# Patient Record
Sex: FEMALE | Race: BLACK OR AFRICAN AMERICAN | NOT HISPANIC OR LATINO | Employment: FULL TIME | URBAN - METROPOLITAN AREA
[De-identification: names, ages, dates, MRNs, and addresses within clinical notes are randomized per-mention and may not be internally consistent; named-entity substitution may affect disease eponyms.]

---

## 2019-07-17 ENCOUNTER — OFFICE VISIT (OUTPATIENT)
Dept: FAMILY MEDICINE | Facility: CLINIC | Age: 39
End: 2019-07-17
Payer: COMMERCIAL

## 2019-07-17 VITALS
BODY MASS INDEX: 21.97 KG/M2 | SYSTOLIC BLOOD PRESSURE: 117 MMHG | TEMPERATURE: 97.5 F | HEIGHT: 63 IN | OXYGEN SATURATION: 100 % | DIASTOLIC BLOOD PRESSURE: 73 MMHG | HEART RATE: 65 BPM | WEIGHT: 124 LBS

## 2019-07-17 DIAGNOSIS — Z00.00 ENCOUNTER FOR PREVENTATIVE ADULT HEALTH CARE EXAMINATION: Primary | ICD-10-CM

## 2019-07-17 DIAGNOSIS — N94.6 DYSMENORRHEA: ICD-10-CM

## 2019-07-17 DIAGNOSIS — Z13.1 SCREENING FOR DIABETES MELLITUS: ICD-10-CM

## 2019-07-17 DIAGNOSIS — Z23 NEED FOR TDAP VACCINATION: ICD-10-CM

## 2019-07-17 DIAGNOSIS — R63.4 WEIGHT LOSS: ICD-10-CM

## 2019-07-17 DIAGNOSIS — Z13.6 CARDIOVASCULAR SCREENING; LDL GOAL LESS THAN 100: ICD-10-CM

## 2019-07-17 DIAGNOSIS — H54.7 VISION PROBLEM: ICD-10-CM

## 2019-07-17 LAB
ALBUMIN SERPL-MCNC: 3.9 G/DL (ref 3.4–5)
ALP SERPL-CCNC: 51 U/L (ref 40–150)
ALT SERPL W P-5'-P-CCNC: 25 U/L (ref 0–50)
ANION GAP SERPL CALCULATED.3IONS-SCNC: 8 MMOL/L (ref 3–14)
AST SERPL W P-5'-P-CCNC: 28 U/L (ref 0–45)
BASOPHILS # BLD AUTO: 0 10E9/L (ref 0–0.2)
BASOPHILS NFR BLD AUTO: 0.2 %
BILIRUB SERPL-MCNC: 0.1 MG/DL (ref 0.2–1.3)
BUN SERPL-MCNC: 13 MG/DL (ref 7–30)
CALCIUM SERPL-MCNC: 8.8 MG/DL (ref 8.5–10.1)
CHLORIDE SERPL-SCNC: 109 MMOL/L (ref 94–109)
CHOLEST SERPL-MCNC: 164 MG/DL
CO2 SERPL-SCNC: 24 MMOL/L (ref 20–32)
CREAT SERPL-MCNC: 0.68 MG/DL (ref 0.52–1.04)
DIFFERENTIAL METHOD BLD: NORMAL
EOSINOPHIL # BLD AUTO: 0.1 10E9/L (ref 0–0.7)
EOSINOPHIL NFR BLD AUTO: 2.2 %
ERYTHROCYTE [DISTWIDTH] IN BLOOD BY AUTOMATED COUNT: 13.9 % (ref 10–15)
GFR SERPL CREATININE-BSD FRML MDRD: >90 ML/MIN/{1.73_M2}
GLUCOSE SERPL-MCNC: 74 MG/DL (ref 70–99)
HCT VFR BLD AUTO: 35.6 % (ref 35–47)
HDLC SERPL-MCNC: 89 MG/DL
HGB BLD-MCNC: 11.9 G/DL (ref 11.7–15.7)
LDLC SERPL CALC-MCNC: 61 MG/DL
LYMPHOCYTES # BLD AUTO: 2.5 10E9/L (ref 0.8–5.3)
LYMPHOCYTES NFR BLD AUTO: 46.5 %
MCH RBC QN AUTO: 30.6 PG (ref 26.5–33)
MCHC RBC AUTO-ENTMCNC: 33.4 G/DL (ref 31.5–36.5)
MCV RBC AUTO: 92 FL (ref 78–100)
MONOCYTES # BLD AUTO: 0.4 10E9/L (ref 0–1.3)
MONOCYTES NFR BLD AUTO: 6.5 %
NEUTROPHILS # BLD AUTO: 2.4 10E9/L (ref 1.6–8.3)
NEUTROPHILS NFR BLD AUTO: 44.6 %
NONHDLC SERPL-MCNC: 75 MG/DL
PLATELET # BLD AUTO: 243 10E9/L (ref 150–450)
POTASSIUM SERPL-SCNC: 3.6 MMOL/L (ref 3.4–5.3)
PROT SERPL-MCNC: 7.4 G/DL (ref 6.8–8.8)
RBC # BLD AUTO: 3.89 10E12/L (ref 3.8–5.2)
SODIUM SERPL-SCNC: 141 MMOL/L (ref 133–144)
TRIGL SERPL-MCNC: 72 MG/DL
TSH SERPL DL<=0.005 MIU/L-ACNC: 1.54 MU/L (ref 0.4–4)
WBC # BLD AUTO: 5.4 10E9/L (ref 4–11)

## 2019-07-17 PROCEDURE — 84443 ASSAY THYROID STIM HORMONE: CPT | Performed by: FAMILY MEDICINE

## 2019-07-17 PROCEDURE — 36415 COLL VENOUS BLD VENIPUNCTURE: CPT | Performed by: FAMILY MEDICINE

## 2019-07-17 PROCEDURE — 80053 COMPREHEN METABOLIC PANEL: CPT | Performed by: FAMILY MEDICINE

## 2019-07-17 PROCEDURE — 90471 IMMUNIZATION ADMIN: CPT | Performed by: FAMILY MEDICINE

## 2019-07-17 PROCEDURE — 90715 TDAP VACCINE 7 YRS/> IM: CPT | Performed by: FAMILY MEDICINE

## 2019-07-17 PROCEDURE — 99386 PREV VISIT NEW AGE 40-64: CPT | Mod: 25 | Performed by: FAMILY MEDICINE

## 2019-07-17 PROCEDURE — 80061 LIPID PANEL: CPT | Performed by: FAMILY MEDICINE

## 2019-07-17 PROCEDURE — 85025 COMPLETE CBC W/AUTO DIFF WBC: CPT | Performed by: FAMILY MEDICINE

## 2019-07-17 ASSESSMENT — ENCOUNTER SYMPTOMS
PALPITATIONS: 0
EYE PAIN: 1
JOINT SWELLING: 0
ARTHRALGIAS: 0
PARESTHESIAS: 0
HEMATOCHEZIA: 0
CHILLS: 0
DIARRHEA: 0
NERVOUS/ANXIOUS: 0
HEADACHES: 1
WEAKNESS: 0
HEARTBURN: 0
BREAST MASS: 0
HEMATURIA: 0
FEVER: 0
CONSTIPATION: 0
NAUSEA: 0
MYALGIAS: 0
FREQUENCY: 0
SHORTNESS OF BREATH: 0
DIZZINESS: 0
DYSURIA: 0
SORE THROAT: 0

## 2019-07-17 ASSESSMENT — PAIN SCALES - GENERAL: PAINLEVEL: NO PAIN (0)

## 2019-07-17 ASSESSMENT — MIFFLIN-ST. JEOR: SCORE: 1202.71

## 2019-07-17 NOTE — LETTER
[facility]   [address] [place]  [address]  [phone]                                   July 18, 2019    Erin Cardenas  [address] [facility] [address]        Dear Erin,    These lab results are all fine.    Results for orders placed or performed in visit on 07/17/19   Lipid panel reflex to direct LDL Non-fasting   Result Value Ref Range    Cholesterol 164 <200 mg/dL    Triglycerides 72 <150 mg/dL    HDL Cholesterol 89 >49 mg/dL    LDL Cholesterol Calculated 61 <100 mg/dL    Non HDL Cholesterol 75 <130 mg/dL   TSH with free T4 reflex   Result Value Ref Range    TSH 1.54 0.40 - 4.00 mU/L   Comprehensive metabolic panel   Result Value Ref Range    Sodium 141 133 - 144 mmol/L    Potassium 3.6 3.4 - 5.3 mmol/L    Chloride 109 94 - 109 mmol/L    Carbon Dioxide 24 20 - 32 mmol/L    Anion Gap 8 3 - 14 mmol/L    Glucose 74 70 - 99 mg/dL    Urea Nitrogen 13 7 - 30 mg/dL    Creatinine 0.68 0.52 - 1.04 mg/dL    GFR Estimate >90 >60 mL/min/[1.73_m2]    GFR Estimate If Black >90 >60 mL/min/[1.73_m2]    Calcium 8.8 8.5 - 10.1 mg/dL    Bilirubin Total 0.1 (L) 0.2 - 1.3 mg/dL    Albumin 3.9 3.4 - 5.0 g/dL    Protein Total 7.4 6.8 - 8.8 g/dL    Alkaline Phosphatase 51 40 - 150 U/L    ALT 25 0 - 50 U/L    AST 28 0 - 45 U/L   CBC with platelets differential   Result Value Ref Range    WBC 5.4 4.0 - 11.0 10e9/L    RBC Count 3.89 3.8 - 5.2 10e12/L    Hemoglobin 11.9 11.7 - 15.7 g/dL    Hematocrit 35.6 35.0 - 47.0 %    MCV 92 78 - 100 fl    MCH 30.6 26.5 - 33.0 pg    MCHC 33.4 31.5 - 36.5 g/dL    RDW 13.9 10.0 - 15.0 %    Platelet Count 243 150 - 450 10e9/L    % Neutrophils 44.6 %    % Lymphocytes 46.5 %    % Monocytes 6.5 %    % Eosinophils 2.2 %    % Basophils 0.2 %    Absolute Neutrophil 2.4 1.6 - 8.3 10e9/L    Absolute Lymphocytes 2.5 0.8 - 5.3 10e9/L    Absolute Monocytes 0.4 0.0 - 1.3 10e9/L    Absolute Eosinophils 0.1 0.0 - 0.7 10e9/L    Absolute Basophils 0.0 0.0 - 0.2 10e9/L     Diff Method Automated Method        If you have any questions please call the clinic at [phone]    Sincerely,    Derrell Mckeon MD  hnr

## 2019-07-17 NOTE — PATIENT INSTRUCTIONS
Call to schedule eye exam     Call to schedule with gynecology regarding possible tubal ligation ( permanent birth control ) and also regarding possible pap smear to screen for cervical cancer and also the painful periods    For the painful periods, try to start the ibuprofen 1-2 days before the period starts    Use condoms for intercourse until more permanent birth control done    We will send you lab results

## 2019-07-17 NOTE — PROGRESS NOTES
SUBJECTIVE:   CC: Erin Cardenas is an 39 year old woman who presents for preventive health visit.     Healthy Habits:     Getting at least 3 servings of Calcium per day:  Yes    Bi-annual eye exam:  Yes    Dental care twice a year:  NO    Sleep apnea or symptoms of sleep apnea:  None    Diet:  Regular (no restrictions)    Frequency of exercise:  2-3 days/week    Duration of exercise:  15-30 minutes    Taking medications regularly:  Yes    Medication side effects:  None    PHQ-2 Total Score: 0    Additional concerns today:  Yes          none    Today's PHQ-2 Score:   PHQ-2 ( 1999 Pfizer) 7/17/2019   Q1: Little interest or pleasure in doing things 0   Q2: Feeling down, depressed or hopeless 0   PHQ-2 Score 0   Q1: Little interest or pleasure in doing things Not at all   Q2: Feeling down, depressed or hopeless Not at all   PHQ-2 Score 0       Abuse: Current or Past(Physical, Sexual or Emotional)- No  Do you feel safe in your environment? Yes    Social History     Tobacco Use     Smoking status: Never Smoker     Smokeless tobacco: Never Used   Substance Use Topics     Alcohol use: Yes     If you drink alcohol do you typically have >3 drinks per day or >7 drinks per week? No    Alcohol Use 7/17/2019   Prescreen: >3 drinks/day or >7 drinks/week? No   Prescreen: >3 drinks/day or >7 drinks/week? -   No flowsheet data found.    Reviewed orders with patient.  Reviewed health maintenance and updated orders accordingly - Yes       Mammogram Screening: Patient under age 50, mutual decision reflected in health maintenance.      Pertinent mammograms are reviewed under the imaging tab.  History of abnormal Pap smear: never had a pap         Reviewed and updated as needed this visit by clinical staff  Tobacco  Allergies  Meds  Med Hx  Surg Hx  Fam Hx  Soc Hx        Reviewed and updated as needed this visit by Provider            Review of Systems   Constitutional: Negative for chills and fever.   HENT: Negative for congestion,  "ear pain, hearing loss and sore throat.    Eyes: Positive for pain and visual disturbance.   Respiratory: Negative for shortness of breath.    Cardiovascular: Negative for chest pain, palpitations and peripheral edema.   Gastrointestinal: Negative for constipation, diarrhea, heartburn, hematochezia and nausea.   Breasts:  Negative for tenderness, breast mass and discharge.   Genitourinary: Positive for pelvic pain. Negative for dysuria, frequency, genital sores, hematuria, urgency, vaginal bleeding and vaginal discharge.   Musculoskeletal: Negative for arthralgias, joint swelling and myalgias.   Skin: Negative for rash.   Neurological: Positive for headaches. Negative for dizziness, weakness and paresthesias.   Psychiatric/Behavioral: Negative for mood changes. The patient is not nervous/anxious.      No glasses or contacts    No eye exam in last few years    Eye pain an blurred vision for a couple years     Pain with period  Bad pain    4 over the counter ibuprofen     OBJECTIVE:   /73 (BP Location: Left arm, Patient Position: Chair, Cuff Size: Adult Regular)   Pulse 65   Temp 97.5  F (36.4  C) (Oral)   Ht 1.61 m (5' 3.39\")   Wt 56.2 kg (124 lb)   LMP 06/27/2019   SpO2 100%   Breastfeeding? No   BMI 21.70 kg/m    Physical Exam  GENERAL: healthy, alert and no distress  EYES: Eyes grossly normal to inspection, PERRL and conjunctivae and sclerae normal  HENT: ear canals and TM's normal, nose and mouth without ulcers or lesions  NECK: no adenopathy, no asymmetry, masses, or scars and thyroid normal to palpation  RESP: lungs clear to auscultation - no rales, rhonchi or wheezes  CV: regular rate and rhythm, normal S1 S2, no S3 or S4, no murmur, click or rub, no peripheral edema and peripheral pulses strong  ABDOMEN: soft, nontender, no hepatosplenomegaly, no masses and bowel sounds normal  MS: no gross musculoskeletal defects noted, no edema  SKIN: no suspicious lesions or rashes  NEURO: Normal strength and " tone, mentation intact and speech normal  PSYCH: mentation appears normal, affect normal/bright    Diagnostic Test Results:  Labs reviewed in Epic    Lost 24 lb in about 8 months    Now stable    ASSESSMENT/PLAN:   Erin was seen today for physical and health maintenance.    Diagnoses and all orders for this visit:    Encounter for preventative adult health care examination    Need for Tdap vaccination  -     TDAP, IM (10 - 64 YRS) - Adacel  -     VACCINE ADMINISTRATION, INITIAL    Contraception  -     OB/GYN REFERRAL    Dysmenorrhea  -     OB/GYN REFERRAL    Screening for diabetes mellitus  -     Cancel: Glucose whole blood    CARDIOVASCULAR SCREENING; LDL GOAL LESS THAN 100  -     Lipid panel reflex to direct LDL Non-fasting    Vision problem  -     OPTOMETRY REFERRAL    Weight loss  -     TSH with free T4 reflex  -     Comprehensive metabolic panel  -     CBC with platelets differential    Other orders  -     SCREENING QUESTIONS FOR PED IMMUNIZATIONS    Discussed multiple issues with patient   She has never had pap; [place]  Advised taking ibuprofen for a day or two prior to onset of period to head off some of the menstrual pain  Patient and  ( who we also saw today) have 4 kids and don't want any more; thus advised they use condoms every time until they do something more permanent.  Did referrals for gynecology for her and urology for him.  They can discuss as couple and decide whether to pursue tubal or vasectomy.  No std concerns.  Patient lost wt but it is now stable and she is still in normal bmi range.  If wt loss occurs again, return to clinic.  We did some labwork.  tdap given  No std concern  Patient to schedule eye exam    COUNSELING:  Reviewed preventive health counseling, as reflected in patient instructions       Regular exercise       Healthy diet/nutrition       Vision screening       Contraception       Family planning    Estimated body mass index is 21.7  "kg/m  as calculated from the following:    Height as of this encounter: 1.61 m (5' 3.39\").    Weight as of this encounter: 56.2 kg (124 lb).         reports that she has never smoked. She has never used smokeless tobacco.      Counseling Resources:  ATP IV Guidelines  Pooled Cohorts Equation Calculator  Breast Cancer Risk Calculator  FRAX Risk Assessment  ICSI Preventive Guidelines  Dietary Guidelines for Americans, 2010  USDA's MyPlate  ASA Prophylaxis  Lung CA Screening    Derrell Mckeon MD  [facility]  "

## 2021-01-08 NOTE — PROGRESS NOTES
"  Assessment & Plan     Pregnancy test positive  Reviewed healthy pregnancy lifestyle: No smoking or alcohol, start daily prenatal vitamin, avoid raw/undercooked meats and soft cheeses, limit fish to 2 servings per week and avoid tuna or seabass which are high in mercury, limit caffeine to 2 cups of coffee per day, don't change litterbox.   Reviewed warning signs of miscarriage including cramping and vaginal bleeding.   Schedule first OB around 8 weeks gestation  - HCG Qual, Urine ([id number])  - Prenatal Vit-Fe Fumarate-FA (PRENATAL VITAMIN PLUS LOW IRON) 27-1 MG TABS; Take 1 tablet by mouth daily  - OB/GYN REFERRAL    Nausea and vomiting during pregnancy  Eat small portions of bland food frequently, avoiding an empty stomach.  - pyridOXINE (VITAMIN B6) 25 MG tablet; Take 1 tablet (25 mg) by mouth 3 times daily as needed (nausea)    Review of the result(s) of each unique test - urine pregnancy test                     See Patient Instructions    Return in about 2 weeks (around 2021) for OB appt.    TRINITY [facility] DEON Khan is a 40 year old  who presents to clinic today for the following health issues     HPI       Chief Complaint   Patient presents with     Amenorrhea     NAREN 9/3/21  6w5d  Has had some nausea and has lost weight- unsure how much.  Prior pregnancies were in [place], no complications.        Review of Systems   Constitutional, HEENT, cardiovascular, pulmonary, gi and gu systems are negative, except as otherwise noted.      Objective    /74 (BP Location: Right arm, Patient Position: Chair, Cuff Size: Adult Regular)   Pulse 81   Temp 98.1  F (36.7  C) (Oral)   Ht 1.613 m (5' 3.5\")   Wt 55.8 kg (123 lb)   LMP 2020 (Exact Date)   SpO2 100%   BMI 21.45 kg/m    Body mass index is 21.45 kg/m .  Physical Exam   GENERAL: healthy, alert and no distress  PSYCH: mentation appears normal, affect normal/bright    Results for " orders placed or performed in visit on 01/13/21 (from the past 24 hour(s))   HCG Qual, Urine ([id number])   Result Value Ref Range    HCG Qual Urine Positive (A) NEG^Negative

## 2021-01-13 ENCOUNTER — OFFICE VISIT (OUTPATIENT)
Dept: FAMILY MEDICINE | Facility: CLINIC | Age: 41
End: 2021-01-13
Payer: COMMERCIAL

## 2021-01-13 VITALS
BODY MASS INDEX: 21 KG/M2 | HEIGHT: 64 IN | DIASTOLIC BLOOD PRESSURE: 74 MMHG | WEIGHT: 123 LBS | SYSTOLIC BLOOD PRESSURE: 117 MMHG | TEMPERATURE: 98.1 F | HEART RATE: 81 BPM | OXYGEN SATURATION: 100 %

## 2021-01-13 DIAGNOSIS — Z32.01 PREGNANCY TEST POSITIVE: Primary | ICD-10-CM

## 2021-01-13 DIAGNOSIS — O21.9 NAUSEA AND VOMITING DURING PREGNANCY: ICD-10-CM

## 2021-01-13 LAB — HCG UR QL: POSITIVE

## 2021-01-13 PROCEDURE — 99213 OFFICE O/P EST LOW 20 MIN: CPT | Performed by: NURSE PRACTITIONER

## 2021-01-13 PROCEDURE — 81025 URINE PREGNANCY TEST: CPT | Performed by: NURSE PRACTITIONER

## 2021-01-13 RX ORDER — PYRIDOXINE HCL (VITAMIN B6) 25 MG
25 TABLET ORAL 3 TIMES DAILY PRN
Qty: 90 TABLET | Refills: 1 | Status: SHIPPED | OUTPATIENT
Start: 2021-01-13 | End: 2021-10-27

## 2021-01-13 RX ORDER — VITAMIN A, VITAMIN C, VITAMIN D-3, VITAMIN E, VITAMIN B-1, VITAMIN B-2, NIACIN, VITAMIN B-6, CALCIUM, IRON, ZINC, COPPER 4000; 120; 400; 22; 1.84; 3; 20; 10; 1; 12; 200; 27; 25; 2 [IU]/1; MG/1; [IU]/1; MG/1; MG/1; MG/1; MG/1; MG/1; MG/1; UG/1; MG/1; MG/1; MG/1; MG/1
1 TABLET ORAL DAILY
Qty: 100 TABLET | Refills: 3 | Status: SHIPPED | OUTPATIENT
Start: 2021-01-13 | End: 2022-11-21

## 2021-01-13 ASSESSMENT — MIFFLIN-ST. JEOR: SCORE: 1194.98

## 2021-01-27 ENCOUNTER — PRENATAL OFFICE VISIT (OUTPATIENT)
Dept: OBGYN | Facility: CLINIC | Age: 41
End: 2021-01-27
Payer: COMMERCIAL

## 2021-01-27 VITALS
WEIGHT: 126 LBS | HEART RATE: 71 BPM | DIASTOLIC BLOOD PRESSURE: 79 MMHG | SYSTOLIC BLOOD PRESSURE: 124 MMHG | BODY MASS INDEX: 21.51 KG/M2 | HEIGHT: 64 IN | OXYGEN SATURATION: 99 %

## 2021-01-27 DIAGNOSIS — O09.521 MULTIGRAVIDA OF ADVANCED MATERNAL AGE IN FIRST TRIMESTER: Primary | ICD-10-CM

## 2021-01-27 DIAGNOSIS — Z12.4 PAP SMEAR FOR CERVICAL CANCER SCREENING: ICD-10-CM

## 2021-01-27 LAB
BASOPHILS # BLD AUTO: 0 10E9/L (ref 0–0.2)
BASOPHILS NFR BLD AUTO: 0.1 %
DIFFERENTIAL METHOD BLD: NORMAL
EOSINOPHIL # BLD AUTO: 0.1 10E9/L (ref 0–0.7)
EOSINOPHIL NFR BLD AUTO: 1.2 %
ERYTHROCYTE [DISTWIDTH] IN BLOOD BY AUTOMATED COUNT: 13.4 % (ref 10–15)
HCT VFR BLD AUTO: 38.6 % (ref 35–47)
HGB BLD-MCNC: 13.1 G/DL (ref 11.7–15.7)
LYMPHOCYTES # BLD AUTO: 2.5 10E9/L (ref 0.8–5.3)
LYMPHOCYTES NFR BLD AUTO: 37.3 %
MCH RBC QN AUTO: 31.6 PG (ref 26.5–33)
MCHC RBC AUTO-ENTMCNC: 33.9 G/DL (ref 31.5–36.5)
MCV RBC AUTO: 93 FL (ref 78–100)
MONOCYTES # BLD AUTO: 0.5 10E9/L (ref 0–1.3)
MONOCYTES NFR BLD AUTO: 6.9 %
NEUTROPHILS # BLD AUTO: 3.6 10E9/L (ref 1.6–8.3)
NEUTROPHILS NFR BLD AUTO: 54.5 %
PLATELET # BLD AUTO: 241 10E9/L (ref 150–450)
RBC # BLD AUTO: 4.14 10E12/L (ref 3.8–5.2)
WBC # BLD AUTO: 6.7 10E9/L (ref 4–11)

## 2021-01-27 PROCEDURE — G0145 SCR C/V CYTO,THINLAYER,RESCR: HCPCS | Performed by: OBSTETRICS & GYNECOLOGY

## 2021-01-27 PROCEDURE — 86850 RBC ANTIBODY SCREEN: CPT | Performed by: OBSTETRICS & GYNECOLOGY

## 2021-01-27 PROCEDURE — 87340 HEPATITIS B SURFACE AG IA: CPT | Performed by: OBSTETRICS & GYNECOLOGY

## 2021-01-27 PROCEDURE — 86901 BLOOD TYPING SEROLOGIC RH(D): CPT | Performed by: OBSTETRICS & GYNECOLOGY

## 2021-01-27 PROCEDURE — 87086 URINE CULTURE/COLONY COUNT: CPT | Performed by: OBSTETRICS & GYNECOLOGY

## 2021-01-27 PROCEDURE — 99207 PR FIRST OB VISIT: CPT | Performed by: OBSTETRICS & GYNECOLOGY

## 2021-01-27 PROCEDURE — 85025 COMPLETE CBC W/AUTO DIFF WBC: CPT | Performed by: OBSTETRICS & GYNECOLOGY

## 2021-01-27 PROCEDURE — 86780 TREPONEMA PALLIDUM: CPT | Mod: 90 | Performed by: OBSTETRICS & GYNECOLOGY

## 2021-01-27 PROCEDURE — 99000 SPECIMEN HANDLING OFFICE-LAB: CPT | Performed by: OBSTETRICS & GYNECOLOGY

## 2021-01-27 PROCEDURE — 86762 RUBELLA ANTIBODY: CPT | Performed by: OBSTETRICS & GYNECOLOGY

## 2021-01-27 PROCEDURE — 86900 BLOOD TYPING SEROLOGIC ABO: CPT | Performed by: OBSTETRICS & GYNECOLOGY

## 2021-01-27 PROCEDURE — 87389 HIV-1 AG W/HIV-1&-2 AB AG IA: CPT | Performed by: OBSTETRICS & GYNECOLOGY

## 2021-01-27 PROCEDURE — 87624 HPV HI-RISK TYP POOLED RSLT: CPT | Performed by: OBSTETRICS & GYNECOLOGY

## 2021-01-27 PROCEDURE — 36415 COLL VENOUS BLD VENIPUNCTURE: CPT | Performed by: OBSTETRICS & GYNECOLOGY

## 2021-01-27 SDOH — ECONOMIC STABILITY: INCOME INSECURITY: HOW HARD IS IT FOR YOU TO PAY FOR THE VERY BASICS LIKE FOOD, HOUSING, MEDICAL CARE, AND HEATING?: NOT ASKED

## 2021-01-27 SDOH — ECONOMIC STABILITY: FOOD INSECURITY: WITHIN THE PAST 12 MONTHS, YOU WORRIED THAT YOUR FOOD WOULD RUN OUT BEFORE YOU GOT MONEY TO BUY MORE.: NOT ASKED

## 2021-01-27 SDOH — ECONOMIC STABILITY: FOOD INSECURITY: WITHIN THE PAST 12 MONTHS, THE FOOD YOU BOUGHT JUST DIDN'T LAST AND YOU DIDN'T HAVE MONEY TO GET MORE.: NOT ASKED

## 2021-01-27 SDOH — ECONOMIC STABILITY: TRANSPORTATION INSECURITY
IN THE PAST 12 MONTHS, HAS THE LACK OF TRANSPORTATION KEPT YOU FROM MEDICAL APPOINTMENTS OR FROM GETTING MEDICATIONS?: NOT ASKED

## 2021-01-27 SDOH — ECONOMIC STABILITY: TRANSPORTATION INSECURITY
IN THE PAST 12 MONTHS, HAS LACK OF TRANSPORTATION KEPT YOU FROM MEETINGS, WORK, OR FROM GETTING THINGS NEEDED FOR DAILY LIVING?: NOT ASKED

## 2021-01-27 ASSESSMENT — MIFFLIN-ST. JEOR: SCORE: 1208.59

## 2021-01-27 NOTE — PROGRESS NOTES
INITIAL OB ASSESSMENT............................................Date: 2021                            ---------------------    Name: Erin Cardenas.......................................................................Plan Number: [id number]    Age: 40 year old   : 1980  Phone: [phone] (home)   Address: [address] [place] [address]    Marital Status:    Race/Ethnicity:    Occupation:  [employer]  Partner's Name:  Duglas Rodgers, Partner's Occupation:  [employer] medical device     OB Physician: Usama Boss MD       LMP:  Patient's LMP from OB Dating Form:  Patient's last menstrual period was 2020 (exact date).  Regular menses? no  Length of menses: 7-9 days    Obstetrical History  ===================  OB History    Para Term  AB Living   5 4 0 0 0 0   SAB TAB Ectopic Multiple Live Births   0 0 0 0 4      # Outcome Date GA Lbr Michael/2nd Weight Sex Delivery Anes PTL Lv   5 Current            4 Para            3 Para            2 Para            1 Para                Past Medical History:  No past medical history on file.      Past Surgical History:  Past Surgical History:   Procedure Laterality Date     LEG SURGERY Left     ligaments       Current Outpatient Medications   Medication Sig Dispense Refill     Prenatal Vit-Fe Fumarate-FA (PRENATAL VITAMIN PLUS LOW IRON) 27-1 MG TABS Take 1 tablet by mouth daily 100 tablet 3     pyridOXINE (VITAMIN B6) 25 MG tablet Take 1 tablet (25 mg) by mouth 3 times daily as needed (nausea) (Patient not taking: Reported on 2021) 90 tablet 1       No Known Allergies      Social History     Socioeconomic History     Marital status:      Spouse name: Duglas Rodgers     Number of children: 4     Years of education: Not on file     Highest education level: Not on file   Occupational History     Not on file   Social Needs     Financial resource strain: Not on file      Food insecurity     Worry: Not on file     Inability: Not on file     Transportation needs     Medical: Not on file     Non-medical: Not on file   Tobacco Use     Smoking status: Never Smoker     Smokeless tobacco: Never Used   Substance and Sexual Activity     Alcohol use: Yes     Comment: occasional     Drug use: Never     Sexual activity: Yes     Partners: Male   Lifestyle     Physical activity     Days per week: Not on file     Minutes per session: Not on file     Stress: Not on file   Relationships     Social connections     Talks on phone: Not on file     Gets together: Not on file     Attends Amish service: Not on file     Active member of club or organization: Not on file     Attends meetings of clubs or organizations: Not on file     Relationship status: Not on file     Intimate partner violence     Fear of current or ex partner: Not on file     Emotionally abused: Not on file     Physically abused: Not on file     Forced sexual activity: Not on file   Other Topics Concern     Not on file   Social History Narrative     Not on file       , Children  No substance abuse, environmental exposures, mental health risks and No financial concerns.  No pets. Good partner support.       Family History   Problem Relation Age of Onset     No Known Problems Mother      No Known Problems Father      No Known Problems Sister      No Known Problems Brother      No Known Problems Brother      Breast Cancer No family hx of      Colon Cancer No family hx of      Sickle Cell Anemia No family hx of      Sickle Cell Trait No family hx of        Past Medical History of Father of Baby:   No significant medical history     No known genetic disease in patient's 1st or 2nd degree relatives  No known genetic diseases in the FOB's 1st or 2nd degree relatives      REVIEW OF SYMPTOMS:   History Since Last Menstrual Period:    nausea and fatigue     PHYSICAL EXAM:  /79 (BP Location: Right arm, Cuff Size: Adult Regular)  "  Pulse 71   Ht 1.613 m (5' 3.5\")   Wt 57.2 kg (126 lb)   LMP 2020 (Exact Date)   SpO2 99%   BMI 21.97 kg/m    General:  WNWD female, NAD  Oriented:  X 3  Alert  PSYCH:  mentation appears normal., affect and mood normal  HEENT:  NC/AT, EOMI  NECK:  Neck supple. No adenopathy. Thyroid symmetric, normal size,, Carotids without bruits.  HEART:  RRR  LUNGS:  Clear to auscultation.  Good respiratory effort  BREASTS: not performed   ABDOMEN: Benign, Soft, flat, non-tender, No masses, organomegaly and Bowel sounds normoactive  VULVA: no masses or lesions seen  BUS:  Bartholin's, Urethra, Navy's normal  VAGINA:  No masses or lesions seen.   CERVIX:  Parous, closed, mobile, no discharge  UTERUS:  Normal shape, position and consistency and Nontender; 12 week size  ADNEXA:  No masses palpated, non-tender  EXTREMITIES:No cyanosis, clubbing, warm and well perfused and No edema   GAIT: normal including tandem walk, heel and toe walk.        Assessment:   IUP at 8.5 weeks  Advanced maternal age      Plan:  Options for  testing for chromosomal and birth defects were discussed with the patient.  Diagnostic tests include CVS and Amniocentesis.  We discussed that these tests are definitive but invasive and do carry a risk of fetal loss.    Screening tests include nuchal translucency/blood marker testing in the first trimester and quad screening in the second trimester.  We discussed that these are screening tests and not diagnostic tests and that false positives and negatives are a distinct possibility.  The patient will see [facility].  We discussed physician coverage, tertiary support, diet, exercise, weight gain, schedule of visits, routine and indicated ultrasounds, and childbirth education.   Labs done today, pap smear today  Schedule for the ultrasound for irregular cycles and unsure LMP.   RTC 4 weeks    "

## 2021-01-28 LAB
HBV SURFACE AG SERPL QL IA: NONREACTIVE
HIV 1+2 AB+HIV1 P24 AG SERPL QL IA: NONREACTIVE

## 2021-01-29 LAB
ABO + RH BLD: NORMAL
ABO + RH BLD: NORMAL
BACTERIA SPEC CULT: NO GROWTH
BLD GP AB SCN SERPL QL: NORMAL
BLOOD BANK CMNT PATIENT-IMP: NORMAL
Lab: NORMAL
RUBV IGG SERPL IA-ACNC: 120 IU/ML
SPECIMEN EXP DATE BLD: NORMAL
SPECIMEN SOURCE: NORMAL
T PALLIDUM AB SER QL: NONREACTIVE

## 2021-02-01 ENCOUNTER — ANCILLARY PROCEDURE (OUTPATIENT)
Dept: ULTRASOUND IMAGING | Facility: CLINIC | Age: 41
End: 2021-02-01
Attending: OBSTETRICS & GYNECOLOGY
Payer: COMMERCIAL

## 2021-02-01 DIAGNOSIS — O09.521 MULTIGRAVIDA OF ADVANCED MATERNAL AGE IN FIRST TRIMESTER: ICD-10-CM

## 2021-02-02 LAB
COPATH REPORT: NORMAL
PAP: NORMAL

## 2021-02-03 LAB
FINAL DIAGNOSIS: NORMAL
HPV HR 12 DNA CVX QL NAA+PROBE: NEGATIVE
HPV16 DNA SPEC QL NAA+PROBE: NEGATIVE
HPV18 DNA SPEC QL NAA+PROBE: NEGATIVE
SPECIMEN DESCRIPTION: NORMAL
SPECIMEN SOURCE CVX/VAG CYTO: NORMAL

## 2021-02-23 ENCOUNTER — PRENATAL OFFICE VISIT (OUTPATIENT)
Dept: OBGYN | Facility: CLINIC | Age: 41
End: 2021-02-23
Payer: COMMERCIAL

## 2021-02-23 VITALS
OXYGEN SATURATION: 99 % | DIASTOLIC BLOOD PRESSURE: 72 MMHG | HEART RATE: 88 BPM | BODY MASS INDEX: 22.63 KG/M2 | WEIGHT: 129.8 LBS | SYSTOLIC BLOOD PRESSURE: 114 MMHG

## 2021-02-23 DIAGNOSIS — O09.521 MULTIGRAVIDA OF ADVANCED MATERNAL AGE IN FIRST TRIMESTER: Primary | ICD-10-CM

## 2021-02-23 PROCEDURE — 99207 PR PRENATAL VISIT: CPT | Performed by: OBSTETRICS & GYNECOLOGY

## 2021-02-23 NOTE — PROGRESS NOTES
12w4d   Eating well.   Discussed genetic screening. She has MFM visit scheduled in April.    RTC 4 weeks.   Usama Boss MD

## 2021-03-31 ENCOUNTER — PRENATAL OFFICE VISIT (OUTPATIENT)
Dept: OBGYN | Facility: CLINIC | Age: 41
End: 2021-03-31
Payer: COMMERCIAL

## 2021-03-31 VITALS
WEIGHT: 128.2 LBS | BODY MASS INDEX: 22.35 KG/M2 | HEART RATE: 85 BPM | SYSTOLIC BLOOD PRESSURE: 107 MMHG | DIASTOLIC BLOOD PRESSURE: 66 MMHG | OXYGEN SATURATION: 99 %

## 2021-03-31 DIAGNOSIS — O09.521 MULTIGRAVIDA OF ADVANCED MATERNAL AGE IN FIRST TRIMESTER: Primary | ICD-10-CM

## 2021-03-31 PROCEDURE — 99207 PR PRENATAL VISIT: CPT | Performed by: OBSTETRICS & GYNECOLOGY

## 2021-03-31 NOTE — PROGRESS NOTES
17w5d Eating well.    She has had some lower back pain on the right, but it has resolved.    Discussed genetic screening.  She has MFM visit next month.   RTC 4 weeks  Usama Boss MD

## 2021-04-19 ENCOUNTER — TRANSFERRED RECORDS (OUTPATIENT)
Dept: HEALTH INFORMATION MANAGEMENT | Facility: CLINIC | Age: 41
End: 2021-04-19

## 2021-04-19 ENCOUNTER — TRANSCRIBE ORDERS (OUTPATIENT)
Dept: MATERNAL FETAL MEDICINE | Facility: CLINIC | Age: 41
End: 2021-04-19

## 2021-04-19 ENCOUNTER — MEDICAL CORRESPONDENCE (OUTPATIENT)
Dept: HEALTH INFORMATION MANAGEMENT | Facility: CLINIC | Age: 41
End: 2021-04-19

## 2021-04-19 DIAGNOSIS — O26.90 PREGNANCY RELATED CONDITION, ANTEPARTUM: Primary | ICD-10-CM

## 2021-04-28 ENCOUNTER — PRENATAL OFFICE VISIT (OUTPATIENT)
Dept: OBGYN | Facility: CLINIC | Age: 41
End: 2021-04-28
Payer: COMMERCIAL

## 2021-04-28 VITALS
OXYGEN SATURATION: 100 % | SYSTOLIC BLOOD PRESSURE: 110 MMHG | DIASTOLIC BLOOD PRESSURE: 73 MMHG | BODY MASS INDEX: 22.7 KG/M2 | WEIGHT: 130.2 LBS | HEART RATE: 83 BPM

## 2021-04-28 DIAGNOSIS — L29.2 VULVAR ITCHING: ICD-10-CM

## 2021-04-28 DIAGNOSIS — O09.521 MULTIGRAVIDA OF ADVANCED MATERNAL AGE IN FIRST TRIMESTER: Primary | ICD-10-CM

## 2021-04-28 LAB
SPECIMEN SOURCE: NORMAL
WET PREP SPEC: NORMAL

## 2021-04-28 PROCEDURE — 87210 SMEAR WET MOUNT SALINE/INK: CPT | Performed by: OBSTETRICS & GYNECOLOGY

## 2021-04-28 PROCEDURE — 99207 PR PRENATAL VISIT: CPT | Performed by: OBSTETRICS & GYNECOLOGY

## 2021-04-28 NOTE — PROGRESS NOTES
21w5d.   Doing well without issues/concerns.    They saw MFM and the report shows concerns of Down's Syndrome.   She has fetal echo scheduled for tomorrow.    FMLA paperwork completed today  She has had some vulvar itching for 2 days.  Wet prep was ordered and no yeast seen.  Will re-evaluated in future, as needed.   COVID vaccine discussed.  Her  works for [employer] and he is required to have it.  We discussed the vaccine and COVID infection in pregnancy and pregnancy increases her risk for adverse outcomes in pregnancy.  They will think about it.   RTC 4 weeks  Usama Boss MD

## 2021-04-29 ENCOUNTER — HOSPITAL ENCOUNTER (OUTPATIENT)
Dept: CARDIOLOGY | Facility: CLINIC | Age: 41
Discharge: HOME | End: 2021-04-29
Admitting: OBSTETRICS & GYNECOLOGY
Payer: COMMERCIAL

## 2021-04-29 ENCOUNTER — OFFICE VISIT (OUTPATIENT)
Dept: CARDIOLOGY | Facility: CLINIC | Age: 41
End: 2021-04-29

## 2021-04-29 DIAGNOSIS — O35.BXX0 FETAL ATRIOVENTRICULAR CANAL MALFORMATION DURING PREGNANCY, ANTEPARTUM, SINGLE OR UNSPECIFIED FETUS: Primary | ICD-10-CM

## 2021-04-29 DIAGNOSIS — O35.BXX0 FETAL CARDIAC DISEASE AFFECTING PREGNANCY, SINGLE OR UNSPECIFIED FETUS: Primary | ICD-10-CM

## 2021-04-29 DIAGNOSIS — O26.90 PREGNANCY RELATED CONDITION, ANTEPARTUM: ICD-10-CM

## 2021-04-29 PROCEDURE — 76825 ECHO EXAM OF FETAL HEART: CPT | Mod: 26 | Performed by: PEDIATRICS

## 2021-04-29 PROCEDURE — 93325 DOPPLER ECHO COLOR FLOW MAPG: CPT | Mod: 26 | Performed by: PEDIATRICS

## 2021-04-29 PROCEDURE — 76827 ECHO EXAM OF FETAL HEART: CPT | Mod: 26 | Performed by: PEDIATRICS

## 2021-04-29 PROCEDURE — 93325 DOPPLER ECHO COLOR FLOW MAPG: CPT

## 2021-04-29 PROCEDURE — 99205 OFFICE O/P NEW HI 60 MIN: CPT | Mod: 25 | Performed by: PEDIATRICS

## 2021-04-29 PROCEDURE — 76827 ECHO EXAM OF FETAL HEART: CPT

## 2021-04-29 NOTE — PROGRESS NOTES
[facility]   Heart Center Fetal Consult Note    Patient:  Erin Cardenas MRN:  [id number]   YOB: 1980 Age:  40 year old   Date of Visit:  2021 PCP:  Delaney Avila APRN CNP     Dear Dr. Craig,     I had the pleasure of seeing Erin Cardenas at the [facility] on 2021 in fetal cardiology consultation for fetal echocardiogram results. She presented today accompanied by her . As you know, she is a 40 year old  at 21w6d who presented for fetal echocardiogram today because of suspected atrioventricular canal defect.    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 Trivial common atrioventricular valve insufficiency. The right ventricle is at least moderately hypoplastic; normal systolic function. Normal left ventricular size and systolic function. Unobstructed right and left ventricular outflow tract. The aortic arch is unobstructed. Regular fetal heart rate in the 140-150s. No hydrops.     With the help of diagrams, I reviewed the echo findings today with Erin Cardenas and her partner. I discussed the fetal cardiac anatomy, function, physiology, and plans for intervention following delivery. I recommended delivery at [facility], with plans for post-shaw echocardiogram. I reviewed the importance of a post- transthoracic echocardiogram to confirm these findings and help direct management. I discussed that this fetus will likely undergo the single ventricle pathway due to the size of the right ventricle (we will need to confirm this on subsequent fetal echocardiograms as well as the post-shaw echocardiogram). The fetus will likely not need surgery within the first week of life since both outflow tracts are unobstructed and the aortic arch is likely normal. The initially physiology will  likely be pulmonary over circulation as the pulmonary vascular resistance lowers. They had appropriate questions which I addressed.     Plan:    1. Follow up Fetal Echo at 26 weeks gestation.     Thank you for allowing me to participate in Erin's care. Please do not hesitate to contact me with questions or concerns.    This visit was separate from the performance and interpretation of the ultrasound. The majority of the time (>50%) was spent in counseling and coordination of care. I spent approximately 60 minutes in face-to-face time reviewing the above considerations.    Kieran Bell M.D.  Pediatric Cardiology  [facility] [address], 5th floor, [facility] [address]  Phone [phone]  Fax [phone]

## 2021-05-17 ENCOUNTER — TRANSFERRED RECORDS (OUTPATIENT)
Dept: HEALTH INFORMATION MANAGEMENT | Facility: CLINIC | Age: 41
End: 2021-05-17

## 2021-05-26 ENCOUNTER — PRENATAL OFFICE VISIT (OUTPATIENT)
Dept: OBGYN | Facility: CLINIC | Age: 41
End: 2021-05-26
Payer: COMMERCIAL

## 2021-05-26 VITALS
OXYGEN SATURATION: 100 % | WEIGHT: 132.8 LBS | BODY MASS INDEX: 23.16 KG/M2 | SYSTOLIC BLOOD PRESSURE: 98 MMHG | HEART RATE: 75 BPM | DIASTOLIC BLOOD PRESSURE: 62 MMHG

## 2021-05-26 DIAGNOSIS — O09.521 MULTIGRAVIDA OF ADVANCED MATERNAL AGE IN FIRST TRIMESTER: Primary | ICD-10-CM

## 2021-05-26 LAB
GLUCOSE 1H P 50 G GLC PO SERPL-MCNC: 89 MG/DL (ref 60–129)
HGB BLD-MCNC: 11.7 G/DL (ref 11.7–15.7)

## 2021-05-26 PROCEDURE — 86780 TREPONEMA PALLIDUM: CPT | Mod: 90 | Performed by: OBSTETRICS & GYNECOLOGY

## 2021-05-26 PROCEDURE — 82950 GLUCOSE TEST: CPT | Performed by: OBSTETRICS & GYNECOLOGY

## 2021-05-26 PROCEDURE — 99N1025 PR STATISTIC OBHBG - HEMOGLOBIN: Performed by: OBSTETRICS & GYNECOLOGY

## 2021-05-26 PROCEDURE — 99000 SPECIMEN HANDLING OFFICE-LAB: CPT | Performed by: OBSTETRICS & GYNECOLOGY

## 2021-05-26 PROCEDURE — 36415 COLL VENOUS BLD VENIPUNCTURE: CPT | Performed by: OBSTETRICS & GYNECOLOGY

## 2021-05-26 PROCEDURE — 99207 PR PRENATAL VISIT: CPT | Performed by: OBSTETRICS & GYNECOLOGY

## 2021-05-27 LAB — T PALLIDUM AB SER QL: NONREACTIVE

## 2021-06-02 ENCOUNTER — HOSPITAL ENCOUNTER (OUTPATIENT)
Dept: CARDIOLOGY | Facility: CLINIC | Age: 41
Discharge: HOME | End: 2021-06-02
Attending: PEDIATRICS | Admitting: PEDIATRICS
Payer: COMMERCIAL

## 2021-06-02 DIAGNOSIS — O35.BXX0 FETAL ATRIOVENTRICULAR CANAL MALFORMATION DURING PREGNANCY, ANTEPARTUM, SINGLE OR UNSPECIFIED FETUS: ICD-10-CM

## 2021-06-02 PROCEDURE — 93325 DOPPLER ECHO COLOR FLOW MAPG: CPT

## 2021-06-02 PROCEDURE — 93325 DOPPLER ECHO COLOR FLOW MAPG: CPT | Mod: 26 | Performed by: PEDIATRICS

## 2021-06-02 PROCEDURE — 76827 ECHO EXAM OF FETAL HEART: CPT | Mod: 26 | Performed by: PEDIATRICS

## 2021-06-02 PROCEDURE — 76825 ECHO EXAM OF FETAL HEART: CPT | Mod: 26 | Performed by: PEDIATRICS

## 2021-06-03 ENCOUNTER — OFFICE VISIT (OUTPATIENT)
Dept: CARDIOLOGY | Facility: CLINIC | Age: 41
End: 2021-06-03
Payer: COMMERCIAL

## 2021-06-03 DIAGNOSIS — O35.BXX0 FETAL ATRIOVENTRICULAR CANAL MALFORMATION DURING PREGNANCY, ANTEPARTUM, SINGLE OR UNSPECIFIED FETUS: Primary | ICD-10-CM

## 2021-06-03 PROCEDURE — 99214 OFFICE O/P EST MOD 30 MIN: CPT | Mod: 25 | Performed by: PEDIATRICS

## 2021-06-03 NOTE — PROGRESS NOTES
Fetal Cardiology Consultation    Patient:  Erin Cardenas MRN:  [id number]   YOB: 1980 Age:  41 year old   Date of Visit:  6/3/2021 PCP:  Delaney Avila APRN CNP   NAREN: 9/3/2021, Alternate NAREN Entry EGA: 26w6d weeks     Dear Dr. Blas:    I had the pleasure of seeing Erin Cardenas at the [facility] Fetal [facility]chocardi[facility] Laboratory in [place] on 6/3/2021 in ongoing consultation for fetal echocardiography results. She presented today accompanied by her partner. As you know, she is a 41 year old female with current pregnancy .    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 Trivial common atrioventricular valve insufficiency. The right ventricle is at least moderately hypoplastic; normal systolic function. Normal left ventricular size and systolic function. Unobstructed right and left ventricular outflow tracts; the pulmonary valve appears mildly thickened without flow turbulence; normal ductus arteriosus flow. The aortic arch is unobstructed.     I reviewed and interpreted the fetal echocardiogram today. I discussed the anatomy, physiology, expected fetal course, and need for post- confirmation. The fetal cardiac anatomy is not expected to be dependent on the ductus arteriosus after birth. The expected post- intervention will depend on confirmation of these findings, and is likely to include initial observation only; my hope is that the baby will be able to go home and grow with close monitoring prior to a cavopulmonary anastamosis operation at several-months-old.     -- A follow-up fetal echocardiogram is recommended in 6-8 weeks; this will preferably be with an in-person [place] Islander , as Ms. Cardenas speaks and understands much less English than her partner.  -- A post-shaw transthoracic echocardiogram is recommended immediately  following delivery with pediatric cardiology consultation.  -- Delivery should occur at [facility].  -- Recommend consultation with the Pediatric Cardiothoracic Surgery service at [facility].    Thank you for allowing me to participate in Ms. Cardenas's care. Please don't hesitate to contact me or the Fetal Cardiology team at [facility] with any questions or concerns.     I spent a total of 35 minutes on the date of the encounter doing chart review, patient history, documentation, counseling, and coordinating care.    Jv Dang MD  Pediatric Cardiology  [facility]  Phone [phone]    Review of the result(s) of each unique test - fetal echocardiogram  Discussion of management or test interpretation with external physician/other qualified healthcare professional/appropriate source - Dr. Candice ARCHULETA

## 2021-06-15 ENCOUNTER — TRANSFERRED RECORDS (OUTPATIENT)
Dept: HEALTH INFORMATION MANAGEMENT | Facility: CLINIC | Age: 41
End: 2021-06-15

## 2021-06-15 DIAGNOSIS — O35.BXX0 FETAL CARDIAC ANOMALY AFFECTING PREGNANCY, ANTEPARTUM: Primary | ICD-10-CM

## 2021-06-18 ENCOUNTER — PRENATAL OFFICE VISIT (OUTPATIENT)
Dept: OBGYN | Facility: CLINIC | Age: 41
End: 2021-06-18
Payer: COMMERCIAL

## 2021-06-18 VITALS
BODY MASS INDEX: 22.95 KG/M2 | WEIGHT: 131.6 LBS | HEART RATE: 95 BPM | SYSTOLIC BLOOD PRESSURE: 97 MMHG | DIASTOLIC BLOOD PRESSURE: 58 MMHG

## 2021-06-18 DIAGNOSIS — O09.523 MULTIGRAVIDA OF ADVANCED MATERNAL AGE IN THIRD TRIMESTER: ICD-10-CM

## 2021-06-18 DIAGNOSIS — O09.893 ENCOUNTER FOR SUPERVISION OF HIGH RISK PREGNANCY DUE TO FETAL ANOMALY, THIRD TRIMESTER: Primary | ICD-10-CM

## 2021-06-18 PROCEDURE — 99207 PR PRENATAL VISIT: CPT | Performed by: OBSTETRICS & GYNECOLOGY

## 2021-06-18 NOTE — PROGRESS NOTES
29w0d   Tired.  No HA, visual changes, N/V  Transfer of Care discussed between 32 and 34 weeks  Declines COVID vaccine.  Once she is delivered, she will consider it.    RTC 2 weeks.   Usama Boss MD

## 2021-06-20 ENCOUNTER — HEALTH MAINTENANCE LETTER (OUTPATIENT)
Age: 41
End: 2021-06-20

## 2021-06-22 ENCOUNTER — TELEPHONE (OUTPATIENT)
Dept: MATERNAL FETAL MEDICINE | Facility: CLINIC | Age: 41
End: 2021-06-22

## 2021-06-22 NOTE — TELEPHONE ENCOUNTER
Phone call to schedule patient for RL2/BPP on 7/16 at 1145 prior to fetal echo. Appt scheduled.     Brittni Chiang RN

## 2021-07-06 ENCOUNTER — PRENATAL OFFICE VISIT (OUTPATIENT)
Dept: OBGYN | Facility: CLINIC | Age: 41
End: 2021-07-06
Payer: COMMERCIAL

## 2021-07-06 VITALS
HEART RATE: 80 BPM | WEIGHT: 139 LBS | SYSTOLIC BLOOD PRESSURE: 107 MMHG | BODY MASS INDEX: 24.24 KG/M2 | OXYGEN SATURATION: 100 % | DIASTOLIC BLOOD PRESSURE: 67 MMHG

## 2021-07-06 DIAGNOSIS — Z23 NEED FOR DIPHTHERIA-TETANUS-PERTUSSIS (TDAP) VACCINE: ICD-10-CM

## 2021-07-06 DIAGNOSIS — O35.BXX0 ANOMALY OF HEART OF FETUS AFFECTING PREGNANCY, ANTEPARTUM, SINGLE OR UNSPECIFIED FETUS: Primary | ICD-10-CM

## 2021-07-06 DIAGNOSIS — O09.523 MULTIGRAVIDA OF ADVANCED MATERNAL AGE IN THIRD TRIMESTER: Primary | ICD-10-CM

## 2021-07-06 PROCEDURE — 99207 PR PRENATAL VISIT: CPT | Performed by: OBSTETRICS & GYNECOLOGY

## 2021-07-06 PROCEDURE — 90471 IMMUNIZATION ADMIN: CPT | Performed by: OBSTETRICS & GYNECOLOGY

## 2021-07-06 PROCEDURE — 90715 TDAP VACCINE 7 YRS/> IM: CPT | Performed by: OBSTETRICS & GYNECOLOGY

## 2021-07-06 NOTE — PROGRESS NOTES
Patient case reviewed on pediatric genetics clinic call with geneticists on 7/6/21. 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 Order placed for [facility] genetic counseling appointment to obtain consent for cordblood testing. Fetal chart orders will be placed after completion of [facility] genetic counseling appointment.    Janneth Nicolas MS, [facility]  Genetic Counselor  Maternal Fetal Medicine  [facility]   Phone: [phone]  Pager: [phone]  Email: [email or web address]

## 2021-07-06 NOTE — PROGRESS NOTES
31w4d.    Tired.  No HA, visual changes, N/V  Tubal ligation discussed with patient.  She and her  do not desire any further pregnancies.   The couple are aware the procedure is permanent and not reversible.  Failure rates depend upon the type of procedure performed.  Tubal papers signed today and copy is provided to them.   RTC 2 weeks.  Will need to set up appointment for transfer of care to [facility].   Usama Boss MD

## 2021-07-06 NOTE — PROGRESS NOTES
Tdap (Adacel,Boostrix)    Date Status Dose VIS Date Route Site  Lot# Given By Verified By   7/6/2021 Given 0.5 mL 04/01/2020, Given Today IM LD [employer] [id number] Brianda Tran CMA --   Exp. Date NDC # Product Time Location External Comment   11/6/2022 41679-806-46 Adacel  3:30 PM  --    Updated by: Brianda Tran CMA Updated on: 7/6/2021  3:48 PM

## 2021-07-06 NOTE — NURSING NOTE
Prior to immunization administration, verified patients identity using patient s name and date of birth. Please see Immunization Activity for additional information.     Screening Questionnaire for Adult Immunization    Are you sick today?   No   Do you have allergies to medications, food, a vaccine component or latex?   No   Have you ever had a serious reaction after receiving a vaccination?   No   Do you have a long-term health problem with heart, lung, kidney, or metabolic disease (e.g., diabetes), asthma, a blood disorder, no spleen, complement component deficiency, a cochlear implant, or a spinal fluid leak?  Are you on long-term aspirin therapy?   No   Do you have cancer, leukemia, HIV/AIDS, or any other immune system problem?   No   Do you have a parent, brother, or sister with an immune system problem?   No   In the past 3 months, have you taken medications that affect  your immune system, such as prednisone, other steroids, or anticancer drugs; drugs for the treatment of rheumatoid arthritis, Crohn s disease, or psoriasis; or have you had radiation treatments?   No   Have you had a seizure, or a brain or other nervous system problem?   No   During the past year, have you received a transfusion of blood or blood    products, or been given immune (gamma) globulin or antiviral drug?   No   For women: Are you pregnant or is there a chance you could become       pregnant during the next month?   Yes   Have you received any vaccinations in the past 4 weeks?   No     Immunization questionnaire was positive for at least one answer.  Notified [facility].        Per orders of Dr. Boss, injection of Tdap given by Brianda Tran CMA. Patient instructed to remain in clinic for 15 minutes afterwards, and to report any adverse reaction to me immediately.       Screening performed by Brianda Tran CMA on 7/6/2021 at 3:50 PM.

## 2021-07-16 ENCOUNTER — HOSPITAL ENCOUNTER (OUTPATIENT)
Dept: ULTRASOUND IMAGING | Facility: CLINIC | Age: 41
End: 2021-07-16
Attending: OBSTETRICS & GYNECOLOGY
Payer: COMMERCIAL

## 2021-07-16 ENCOUNTER — TELEPHONE (OUTPATIENT)
Dept: PEDIATRIC CARDIOLOGY | Facility: CLINIC | Age: 41
End: 2021-07-16

## 2021-07-16 ENCOUNTER — HOSPITAL ENCOUNTER (OUTPATIENT)
Dept: CARDIOLOGY | Facility: CLINIC | Age: 41
End: 2021-07-16
Attending: PEDIATRICS
Payer: COMMERCIAL

## 2021-07-16 ENCOUNTER — OFFICE VISIT (OUTPATIENT)
Dept: CARDIOLOGY | Facility: CLINIC | Age: 41
End: 2021-07-16
Payer: COMMERCIAL

## 2021-07-16 ENCOUNTER — OFFICE VISIT (OUTPATIENT)
Dept: MATERNAL FETAL MEDICINE | Facility: CLINIC | Age: 41
End: 2021-07-16
Attending: OBSTETRICS & GYNECOLOGY
Payer: COMMERCIAL

## 2021-07-16 DIAGNOSIS — O35.BXX0 FETAL CARDIAC ANOMALY AFFECTING PREGNANCY, ANTEPARTUM: ICD-10-CM

## 2021-07-16 DIAGNOSIS — O35.BXX0 ANOMALY OF HEART OF FETUS AFFECTING PREGNANCY, ANTEPARTUM, SINGLE OR UNSPECIFIED FETUS: Primary | ICD-10-CM

## 2021-07-16 DIAGNOSIS — O35.BXX0 FETAL CARDIAC DISEASE AFFECTING PREGNANCY, SINGLE OR UNSPECIFIED FETUS: Primary | ICD-10-CM

## 2021-07-16 DIAGNOSIS — O35.BXX0 FETAL ATRIOVENTRICULAR CANAL MALFORMATION DURING PREGNANCY, ANTEPARTUM, SINGLE OR UNSPECIFIED FETUS: ICD-10-CM

## 2021-07-16 PROCEDURE — 93325 DOPPLER ECHO COLOR FLOW MAPG: CPT | Mod: 26 | Performed by: PEDIATRICS

## 2021-07-16 PROCEDURE — 76816 OB US FOLLOW-UP PER FETUS: CPT

## 2021-07-16 PROCEDURE — 76819 FETAL BIOPHYS PROFIL W/O NST: CPT

## 2021-07-16 PROCEDURE — 99213 OFFICE O/P EST LOW 20 MIN: CPT | Mod: 25 | Performed by: PEDIATRICS

## 2021-07-16 PROCEDURE — 93325 DOPPLER ECHO COLOR FLOW MAPG: CPT

## 2021-07-16 PROCEDURE — 76827 ECHO EXAM OF FETAL HEART: CPT | Mod: 26 | Performed by: PEDIATRICS

## 2021-07-16 PROCEDURE — 76819 FETAL BIOPHYS PROFIL W/O NST: CPT | Mod: 26 | Performed by: OBSTETRICS & GYNECOLOGY

## 2021-07-16 PROCEDURE — 76816 OB US FOLLOW-UP PER FETUS: CPT | Mod: 26 | Performed by: OBSTETRICS & GYNECOLOGY

## 2021-07-16 PROCEDURE — 99213 OFFICE O/P EST LOW 20 MIN: CPT | Mod: 25 | Performed by: OBSTETRICS & GYNECOLOGY

## 2021-07-16 PROCEDURE — 76825 ECHO EXAM OF FETAL HEART: CPT | Mod: 26 | Performed by: PEDIATRICS

## 2021-07-16 NOTE — NURSING NOTE
Met with pt, Consent to communicate with spouse signed and sent to [facility]. PCC/Birthplace contact card given. [facility] given for fetal echo and CT Surgery consult today. Plan for pt to have weekly hydrops check/BPP, will also do DRE OB visit next week, NICU/SW on 7/28 (will notify pt of this appt on 7/20), pt scheduled appointments at front dest.   Sandra Valente RN

## 2021-07-16 NOTE — PROGRESS NOTES
Please see the imaging tab for details of the ultrasound performed today.    Brunilda Craig MD  Specialist in Maternal-Fetal Medicine

## 2021-07-16 NOTE — PROGRESS NOTES
[facility]   Heart Center Fetal Consult Note    Patient:  Erin Cardenas MRN:  [id number]   YOB: 1980 Age:  41 year old   Date of Visit:  2021 PCP:  Delaney Avila APRN CNP     Dear Doctor,     I had the pleasure of seeing Erin Cardenas at the [facility] on 2021 in fetal cardiology consultation for ongoing fetal management. She presented today accompanied by her . As you know, she is a 41 year old  at 33w0d who presented for fetal echocardiogram today because of follow up for left dominant unbalanced atrioventricular canal defect.    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 Trivial common atrioventricular valve insufficiency. The right ventricle is at least moderately hypoplastic; normal systolic function. Normal left ventricular size and systolic function. Unobstructed right and left ventricular outflow tracts; the pulmonary and aortic valve appears mildly thickened without flow turbulence; normal ductus arteriosus flow. The aortic arch is unobstructed.    I reviewed and interpreted the fetal echocardiogram today. I discussed the anatomy, physiology, expected fetal course, and need for post-shaw confirmation. The fetal cardiac anatomy is not expected to be dependent on the ductus arteriosus after birth. The expected post- intervention will depend on confirmation of these findings, and is likely to include initial observation only; my hope is that the baby will be able to go home and grow with close monitoring prior to a cavopulmonary anastamosis operation at several-months-old.     Plan:    -- A post-shaw transthoracic echocardiogram is recommended immediately following delivery with pediatric cardiology consultation.  -- Delivery should occur at [facility].  --  Recommend consultation with the Pediatric Cardiothoracic Surgery service at [facility].    Thank you for allowing me to participate in Erin's care. Please do not hesitate to contact me with questions or concerns.    This visit was separate from the performance and interpretation of the ultrasound. The majority of the time (>50%) was spent in counseling and coordination of care. I spent approximately 25 minutes in face-to-face time reviewing the above considerations.    Kieran Bell M.D.  Pediatric Cardiology  [address], 5th floor, [place] [address]  Phone [phone]  Fax [phone]

## 2021-07-21 ENCOUNTER — PRENATAL OFFICE VISIT (OUTPATIENT)
Dept: OBGYN | Facility: CLINIC | Age: 41
End: 2021-07-21
Payer: COMMERCIAL

## 2021-07-21 VITALS
WEIGHT: 142.8 LBS | HEART RATE: 87 BPM | OXYGEN SATURATION: 100 % | BODY MASS INDEX: 24.9 KG/M2 | SYSTOLIC BLOOD PRESSURE: 100 MMHG | DIASTOLIC BLOOD PRESSURE: 66 MMHG

## 2021-07-21 DIAGNOSIS — O09.893 ENCOUNTER FOR SUPERVISION OF HIGH RISK PREGNANCY DUE TO FETAL ANOMALY, THIRD TRIMESTER: ICD-10-CM

## 2021-07-21 DIAGNOSIS — O09.523 MULTIGRAVIDA OF ADVANCED MATERNAL AGE IN THIRD TRIMESTER: Primary | ICD-10-CM

## 2021-07-21 PROCEDURE — 59426 ANTEPARTUM CARE ONLY: CPT | Performed by: OBSTETRICS & GYNECOLOGY

## 2021-07-21 PROCEDURE — 99207 PR PRENATAL VISIT: CPT | Performed by: OBSTETRICS & GYNECOLOGY

## 2021-07-21 NOTE — PROGRESS NOTES
33w5d    Tired.  No HA, visual changes, N/V  Good FM, No ROM, No vaginal bleeding.   She and her  continue to discuss the tubal ligation.    They have appointments at [facility] [place] [facility].   Usama Boss MD

## 2021-07-23 ENCOUNTER — TELEPHONE (OUTPATIENT)
Dept: MATERNAL FETAL MEDICINE | Facility: CLINIC | Age: 41
End: 2021-07-23

## 2021-07-23 NOTE — TELEPHONE ENCOUNTER
This pt no showed their 7/20 appt. Pt has an upcoming appt scheduled for 7/28. Per S.C. its okay to remove these orders.      CHICHI Zazueta

## 2021-07-28 ENCOUNTER — ALLIED HEALTH/NURSE VISIT (OUTPATIENT)
Dept: MATERNAL FETAL MEDICINE | Facility: CLINIC | Age: 41
End: 2021-07-28
Attending: OBSTETRICS & GYNECOLOGY
Payer: COMMERCIAL

## 2021-07-28 ENCOUNTER — HOSPITAL ENCOUNTER (OUTPATIENT)
Dept: ULTRASOUND IMAGING | Facility: CLINIC | Age: 41
End: 2021-07-28
Attending: OBSTETRICS & GYNECOLOGY
Payer: COMMERCIAL

## 2021-07-28 DIAGNOSIS — O35.BXX0 ANOMALY OF HEART OF FETUS AFFECTING PREGNANCY, ANTEPARTUM, SINGLE OR UNSPECIFIED FETUS: ICD-10-CM

## 2021-07-28 DIAGNOSIS — O35.BXX0 ANOMALY OF HEART OF FETUS AFFECTING PREGNANCY, ANTEPARTUM, SINGLE OR UNSPECIFIED FETUS: Primary | ICD-10-CM

## 2021-07-28 PROCEDURE — 76816 OB US FOLLOW-UP PER FETUS: CPT | Mod: 26 | Performed by: OBSTETRICS & GYNECOLOGY

## 2021-07-28 PROCEDURE — 76821 MIDDLE CEREBRAL ARTERY ECHO: CPT | Mod: 26 | Performed by: OBSTETRICS & GYNECOLOGY

## 2021-07-28 PROCEDURE — 76816 OB US FOLLOW-UP PER FETUS: CPT

## 2021-07-28 PROCEDURE — 99214 OFFICE O/P EST MOD 30 MIN: CPT | Mod: 25 | Performed by: OBSTETRICS & GYNECOLOGY

## 2021-07-28 PROCEDURE — 76819 FETAL BIOPHYS PROFIL W/O NST: CPT

## 2021-07-28 PROCEDURE — 99214 OFFICE O/P EST MOD 30 MIN: CPT | Performed by: PEDIATRICS

## 2021-07-28 NOTE — NURSING NOTE
Erin and Duglas present to [facility] for RL2/BPP/NICU consult due to pregnancy c/b fetal CHD, absent nasal bone, pleural effusion, pericardial effusion, polyhydramnios (hydrops). Dr. Garcia met with family. Pt will begin 2x/week hydrops checks/BPP and weekly ob visits.     Brittni Chiang RN

## 2021-07-28 NOTE — PROGRESS NOTES
The patient was seen for an ultrasound in the Maternal-Fetal Medicine Center at the [facility] today.  For a detailed report of the ultrasound examination, please see the ultrasound report which can be found under the imaging tab.    Rubina Garcia MD  , OB/GYN  Maternal-Fetal Medicine  [phone] (Pager)

## 2021-07-28 NOTE — PROGRESS NOTES
Dear Colleagues:    I had the pleasure of meeting your patient Erin Groves in the Maternal Fetal Medicine Clinic for an  consultation.  This was at the request of Dr. Mary Alvarado of Maternal Fetal Medicine Service at the [facility].  Ms. Cardenas has been diagnosed with baby Daniel with known unbalanced AV canal defect. She was accompanied to this visit by her partner Duglas. A professional Vietnamese  via an iPad was also used for the duration of the visit. She was seen at 34w5d PMA and with due date of 9/3/21 and hopeful plan of spontaneous labor and birth at term.     Specifically per echo  there is an unbalanced left dominant AV canal defect with large primum ASD and inlet VSD and trivial common AV valve insufficiency. The right ventricle is at least moderately hypoplastic and with normal systolic function. Normal LV size and function. The outflow tracts for the LV and RV are unobstructed. The pulmonary valve appears mildly thickened without flow turbulence and normal ductus arteriosus flow. The aortic arch is unobstructed. Associated anomalies include a right sided pleural effusion (which had increased on today's ultrasound, absent nasal bone, and mild polyhydramnios. Genetic testing has not yet been pursued per the patient's desire, and the family plans to pursue testing after birth.     We reviewed the overall plans for care for their baby, Daniel following birth.  There will be a  resuscitation team present at the delivery and following some time to visit with the parents, Daniel will be admitted to the  Intensive Care Unit at the [facility].  At this point, he will be closely monitored from a cardiorespiratory standpoint with plans to get an echocardiogram soon after birth.  We discussed overall plans for care, which include the placement of central umbilical arterial and  venous catheters, both for medication administration and blood pressure monitoring as well as blood sampling.  If necessary, respiratory support will be provided to their baby as well, and I mentioned that this might include mechanical ventilation if indicated, though this is less likely with his cardiac anomaly.    His clinical condition and echocardiogram will allow us to determine whether or not oral feedings can be introduced and whether or not an early surgical procedure will be required; currently the hope would be for no early procedure and for him to orally feed, grow, and have a procedure later while monitoring for possible heart failure as an out patient. The baby has been noted to have features that may be suggestive of trisomy 21. I briefly discussed the additional challenages that can be seen in the  period for infants with trisomy 21 including feeding difficulties. The patient and her partner are hopeful this will not be the care for their baby.  We reviewed the interprofessional team on the NICU including consulting services (Cardiology and CV Surgery), social work, lactation (the mother previously breast fed her other babies), dietary, pharmacy, and occupational therapy.    I described the typical structure of rounds with the family and assured them a daily update can be expected from a provider.  I discussed, too, other aspects of the NICU including welcoming policies particularly in relation to the ongoing COVID-19 pandemic. I provided them my contact information should questions arise following this clinic visit.          Thank you very much for the opportunity to meet with this delightful couple.  We look forward to caring for Daniel and for all of them in the NICU at the [facility].      Total time spent was 30 minutes spent, 100% of the time spent in direct patient counseling.    Sincerely,  Marga Grewal MD  Attending  Neonatologist  [facility]

## 2021-08-03 ENCOUNTER — OFFICE VISIT (OUTPATIENT)
Dept: MATERNAL FETAL MEDICINE | Facility: CLINIC | Age: 41
End: 2021-08-03
Attending: OBSTETRICS & GYNECOLOGY
Payer: COMMERCIAL

## 2021-08-03 ENCOUNTER — HOSPITAL ENCOUNTER (OUTPATIENT)
Dept: ULTRASOUND IMAGING | Facility: CLINIC | Age: 41
End: 2021-08-03
Attending: OBSTETRICS & GYNECOLOGY
Payer: COMMERCIAL

## 2021-08-03 VITALS
RESPIRATION RATE: 18 BRPM | WEIGHT: 141.9 LBS | OXYGEN SATURATION: 99 % | BODY MASS INDEX: 24.74 KG/M2 | SYSTOLIC BLOOD PRESSURE: 97 MMHG | HEART RATE: 85 BPM | DIASTOLIC BLOOD PRESSURE: 61 MMHG

## 2021-08-03 DIAGNOSIS — O35.BXX0 ANOMALY OF HEART OF FETUS AFFECTING PREGNANCY, ANTEPARTUM, SINGLE OR UNSPECIFIED FETUS: ICD-10-CM

## 2021-08-03 DIAGNOSIS — O36.23X0 HYDROPS FETALIS IN THIRD TRIMESTER, ANTEPARTUM, NOT APPLICABLE OR UNSPECIFIED FETUS: Primary | ICD-10-CM

## 2021-08-03 DIAGNOSIS — O09.93 SUPERVISION OF HIGH RISK PREGNANCY IN THIRD TRIMESTER: Primary | ICD-10-CM

## 2021-08-03 PROCEDURE — 99212 OFFICE O/P EST SF 10 MIN: CPT | Mod: 25 | Performed by: ADVANCED PRACTICE MIDWIFE

## 2021-08-03 PROCEDURE — 76819 FETAL BIOPHYS PROFIL W/O NST: CPT | Mod: 26 | Performed by: OBSTETRICS & GYNECOLOGY

## 2021-08-03 PROCEDURE — 76816 OB US FOLLOW-UP PER FETUS: CPT | Mod: 26 | Performed by: OBSTETRICS & GYNECOLOGY

## 2021-08-03 PROCEDURE — 76816 OB US FOLLOW-UP PER FETUS: CPT

## 2021-08-03 PROCEDURE — G0463 HOSPITAL OUTPT CLINIC VISIT: HCPCS | Mod: 25

## 2021-08-03 PROCEDURE — 76819 FETAL BIOPHYS PROFIL W/O NST: CPT

## 2021-08-03 ASSESSMENT — PATIENT HEALTH QUESTIONNAIRE - PHQ9: SUM OF ALL RESPONSES TO PHQ QUESTIONS 1-9: 0

## 2021-08-03 ASSESSMENT — PAIN SCALES - GENERAL: PAINLEVEL: NO PAIN (0)

## 2021-08-03 NOTE — PROGRESS NOTES
"Maternal fetal Medicine OB Follow up visit.     Berto Araujo  : 1980  MRN: [id number]    CC: OB Follow-up    Subjective:  Berto Araujo, \"Erin\", is a 41 year old  at 35w4d presenting for routine OB follow-up and to transfer care to [facility] for remainder of pregnancy. Today, she is here with her  and reports feeling well. Reports occasional contractions, but they are not sustained. Normal fetal movement, denies LOF or VB. Does have some increase vaginal discharge that is white to clear in color. No vaginal pain, itching, or odor.       OB Hx:  OB History    Para Term  AB Living   5 4 0 0 0 0   SAB TAB Ectopic Multiple Live Births   0 0 0 0 4      # Outcome Date GA Lbr Michael/2nd Weight Sex Delivery Anes PTL Lv   5 Current            4 Para            3 Para            2 Para            1 Para                  Objective:  BP 97/61 (BP Location: Right arm, Patient Position: Sitting, Cuff Size: Adult Regular)   Pulse 85   Resp 18   Wt 64.4 kg (141 lb 14.4 oz)   LMP 2020 (Exact Date)   SpO2 99%   BMI 24.74 kg/m      Gen: alert, oriented, NAD  Skin: warm, dry, intact  Respiratory: breathing unlabored, no SOB  Abdominal: gravid, non-tender  Pelvic: deferred  Extremities: WNL  Psych: mood WNL, behavior WNL      OB Ultrasound:  Please see \"imaging\" tab under chart review for today's ultrasound results.      Assessment/Plan:  41 year old  at 35w4d here for follow OB visit.    Pregnancy has been complicated by:   Maternal dx:  - AMA    Fetal dx:  1. Unbalanced AV canal defect   2. Pleural effusion- right side  3. Pericardial effusion  4. Absent nasal bone  5. Mod polyhydramnios          #CHD:  Echo : Unbalanced left dominant atrioventricular canal defect with large primum  atrial and inlet ventricular septal defects. Trivial common atrioventricular  valve insufficiency. The right ventricle is at least moderately " hypoplastic;  normal systolic function. Normal left ventricular size and systolic function.  Unobstructed right and left ventricular outflow tracts; the pulmonary and  aortic valve appears mildly thickened without flow turbulence; normal ductus  arteriosus flow. The aortic arch is unobstructed. Small pericardial and  pleural effusions.  - S/p cards and NICU counseling  - NICU admission with echo shortly after birth    #Routine PNC:  - Prenatal labs:  Rh: +  antibody: neg   HepB/HIV/RPR: nonreactive   Rubella: immune     GCT: pass   UC: no growth  - Pap smear: NIL and HPV neg 2021  - Immunizations:  s/p TDap; Covid assess at next visit  - Genetic screening: declined - inpatient genetics consult  - GBS at next visit    # Surveillance:  - Twice weekly BPP with hydrops check    #Delivery planning:  - Dr. Craig in with patient to review US and recommend delivery at 37 weeks due to increased risk of stillbirth. Patient declines induction at this time. Will re-visit timing of delivery at future appointments.  - Labor pain management: needs to be discussed  - Feeding: needs to be discussed  - Contraception plans: needs to be discussed      15 minutes spent on the date of the encounter, doing chart review, history and exam, documentation and further activities as noted.      Carmen Barragan CNM on 8/3/2021 at 11:15 AM

## 2021-08-03 NOTE — NURSING NOTE
EDUARD Mathur  Ipad  used for visit, ID # [id number].     Erin seen in clinic today for RL2 (hydrops check)/BPP and DRE OB visit at 35w4d gestation for pregnancy complicated by fetal CHD, absent nasal bone, pleural effusion, pericardial effusion, polyhydramnios  (see report/notes). VSS. Pt reports positive fetal movement, see flowsheet. Pt denies bldg/lof/contractions/vision changes/chest pain/SOB/edema. Erin reports increase in white mucousy discharge, denies odor or itching. Pt also states she has occasional HA that resolve on their own. New OB folder information and teaching sheets reviewed. PCC/Birthplace contact card given. Dr. Craig reviewed US and met with pt, Carmen Barragan CNM met with pt OB visit done discussed POC (see Ob visit note). Plan for GBS next week, pt declines IOL at this time. Future visits previously scheduled. Pt discharged stable and ambulatory.  [facility] Parking given for long provider wait time.   Sandra Valente, RN

## 2021-08-03 NOTE — LETTER
August 3, 2021      GABRIEL PHILOM L ADOUEPSE SIBOGUEI  [address]  [place] [address]        To Whom It May Concern:    Please note that Erin Waddell Oseassigrid Castellanos is being seen in my care for her current high-risk pregnancy. Her last day of work was 8/1/21. Please feel free to contact me with any further questions or needs. The clinic phone and fax numbers are listed above.      Sincerely,        Carmen Barragan CNM

## 2021-08-06 ENCOUNTER — HOSPITAL ENCOUNTER (OUTPATIENT)
Dept: ULTRASOUND IMAGING | Facility: CLINIC | Age: 41
End: 2021-08-06
Attending: OBSTETRICS & GYNECOLOGY
Payer: COMMERCIAL

## 2021-08-06 ENCOUNTER — OFFICE VISIT (OUTPATIENT)
Dept: MATERNAL FETAL MEDICINE | Facility: CLINIC | Age: 41
End: 2021-08-06
Attending: OBSTETRICS & GYNECOLOGY
Payer: COMMERCIAL

## 2021-08-06 DIAGNOSIS — O35.BXX0 ANOMALY OF HEART OF FETUS AFFECTING PREGNANCY, ANTEPARTUM, SINGLE OR UNSPECIFIED FETUS: ICD-10-CM

## 2021-08-06 DIAGNOSIS — O33.7XX0 FETAL DISPROPORTION DUE TO HYDROPS, ANTEPARTUM: Primary | ICD-10-CM

## 2021-08-06 PROCEDURE — 76816 OB US FOLLOW-UP PER FETUS: CPT | Mod: 26 | Performed by: OBSTETRICS & GYNECOLOGY

## 2021-08-06 PROCEDURE — 76819 FETAL BIOPHYS PROFIL W/O NST: CPT

## 2021-08-06 PROCEDURE — 76819 FETAL BIOPHYS PROFIL W/O NST: CPT | Mod: 26 | Performed by: OBSTETRICS & GYNECOLOGY

## 2021-08-06 PROCEDURE — 76816 OB US FOLLOW-UP PER FETUS: CPT

## 2021-08-06 NOTE — PROGRESS NOTES
Please see full imaging report from [employer] program under imaging tab.      Elise Mustafa MD  Maternal Fetal Medicine

## 2021-08-10 ENCOUNTER — HOSPITAL ENCOUNTER (OUTPATIENT)
Dept: ULTRASOUND IMAGING | Facility: CLINIC | Age: 41
End: 2021-08-10
Attending: OBSTETRICS & GYNECOLOGY
Payer: COMMERCIAL

## 2021-08-10 ENCOUNTER — OFFICE VISIT (OUTPATIENT)
Dept: MATERNAL FETAL MEDICINE | Facility: CLINIC | Age: 41
End: 2021-08-10
Attending: OBSTETRICS & GYNECOLOGY
Payer: COMMERCIAL

## 2021-08-10 VITALS
DIASTOLIC BLOOD PRESSURE: 66 MMHG | SYSTOLIC BLOOD PRESSURE: 108 MMHG | HEART RATE: 92 BPM | RESPIRATION RATE: 16 BRPM | OXYGEN SATURATION: 100 % | BODY MASS INDEX: 25.14 KG/M2 | WEIGHT: 144.2 LBS

## 2021-08-10 DIAGNOSIS — O36.23X0: Primary | ICD-10-CM

## 2021-08-10 DIAGNOSIS — O09.93 SUPERVISION OF HIGH RISK PREGNANCY IN THIRD TRIMESTER: Primary | ICD-10-CM

## 2021-08-10 DIAGNOSIS — O36.23X0 HYDROPS FETALIS IN THIRD TRIMESTER, ANTEPARTUM, NOT APPLICABLE OR UNSPECIFIED FETUS: ICD-10-CM

## 2021-08-10 DIAGNOSIS — O35.BXX0 ANOMALY OF HEART OF FETUS AFFECTING PREGNANCY, ANTEPARTUM, SINGLE OR UNSPECIFIED FETUS: ICD-10-CM

## 2021-08-10 DIAGNOSIS — O35.10X0 SUSPECTED FETAL CHROMOSOME ANOMALY AFFECTING ANTEPARTUM CARE OF MOTHER, SINGLE GESTATION: Primary | ICD-10-CM

## 2021-08-10 PROCEDURE — 76819 FETAL BIOPHYS PROFIL W/O NST: CPT

## 2021-08-10 PROCEDURE — 76816 OB US FOLLOW-UP PER FETUS: CPT

## 2021-08-10 PROCEDURE — 76816 OB US FOLLOW-UP PER FETUS: CPT | Mod: 26 | Performed by: OBSTETRICS & GYNECOLOGY

## 2021-08-10 PROCEDURE — 99212 OFFICE O/P EST SF 10 MIN: CPT | Mod: 25 | Performed by: ADVANCED PRACTICE MIDWIFE

## 2021-08-10 PROCEDURE — 76819 FETAL BIOPHYS PROFIL W/O NST: CPT | Mod: 26 | Performed by: OBSTETRICS & GYNECOLOGY

## 2021-08-10 PROCEDURE — 87653 STREP B DNA AMP PROBE: CPT | Performed by: ADVANCED PRACTICE MIDWIFE

## 2021-08-10 PROCEDURE — 96040 HC GENETIC COUNSELING, EACH 30 MINUTES: CPT | Performed by: GENETIC COUNSELOR, MS

## 2021-08-10 NOTE — NURSING NOTE
Erin seen in clinic today for OBV and f/u comp due to pregnancy c/b fetal CHD and and hydrops at 36.4 weeks gestation (see report/notes). VSS. Pt denies bldg/lof/change in discharge/headache/vision changes/chest pain/SOB/edema.  Carmen Barragan met with pt and discussed POC. GBS collected by CNM and SVE done by CNM - see flow sheet.  New OB folder given to patient at last visit and she states she has no questions at this time.  Erin has scheduled future appointments. Patient needs MCA's done with her BPP this Friday (8/13).  Ultrasound read and discussed with patient by Dr. Michel.  Pt discharged stable and ambulatory accompanied by .      Partially impaired: cannot see medication labels or newsprint, but can see obstacles in path, and the surrounding layout; can count fingers at arm's length/no vision in right eye

## 2021-08-10 NOTE — PROGRESS NOTES
"Maternal fetal Medicine OB Follow up visit.     Berto Groves  : 1980  MRN: [id number]    CC: OB Follow-up    Subjective:  Berto Groves is a 41 year old  at 36w4d presenting for routine OB follow-up. Today, she is here with her  and feeling well. Has been feeling irregular contractions for a few days, but nothing consistent or painful.  Denies loss of fluid or vaginal bleeding.  Reports fetal movement.          OB Hx:  OB History    Para Term  AB Living   5 4 0 0 0 0   SAB TAB Ectopic Multiple Live Births   0 0 0 0 4      # Outcome Date GA Lbr Michael/2nd Weight Sex Delivery Anes PTL Lv   5 Current            4 Para            3 Para            2 Para            1 Para                  Objective:  /66   Pulse 92   Resp 16   Wt 65.4 kg (144 lb 3.2 oz)   LMP 2020 (Exact Date)   SpO2 100%   BMI 25.14 kg/m      Gen: alert, oriented, NAD  Skin: warm, dry, intact  Respiratory: breathing unlabored, no SOB  Abdominal: gravid, non-tender  Pelvic: external genitalia WNL. SVE: closed/thick/high.  Extremities: WNL  Psych: mood WNL, behavior WNL      OB Ultrasound:  Please see \"imaging\" tab under chart review for today's ultrasound results.      Assessment/Plan:  41 year old  at 36w4d here for follow OB visit.    Pregnancy has been complicated by:   Maternal dx:  - AMA     Fetal dx:  1. Unbalanced AV canal defect   2. Pleural effusion- right side  3. Pericardial effusion  4. Absent nasal bone          #CHD:  Echo : Unbalanced left dominant atrioventricular canal defect with large primum  atrial and inlet ventricular septal defects. Trivial common atrioventricular  valve insufficiency. The right ventricle is at least moderately hypoplastic;  normal systolic function. Normal left ventricular size and systolic function.  Unobstructed right and left ventricular outflow tracts; the pulmonary and  aortic valve appears mildly " thickened without flow turbulence; normal ductus  arteriosus flow. The aortic arch is unobstructed. Small pericardial and  pleural effusions.  - S/p cards and NICU counseling  - NICU admission with echo shortly after birth     #Routine PNC:  - Prenatal labs:  Rh: +  antibody: neg               HepB/HIV/RPR: nonreactive               Rubella: immune                 GCT: pass               UC: no growth  - Pap smear: NIL and HPV neg 2021  - Immunizations:  s/p TDap; declines Covid vaccine while pregnant  - Genetic screening: declined - inpatient genetics consult  - GBS collected today     # Surveillance:  - Twice weekly BPP with hydrops check     #Delivery planning:  - IOL at 37wks has been recommended, but patient declines at this time and prefers to await spontaneous labor. Will update staff if she desires induction after 37wks.   - Labor pain management: needs to be discussed  - Feeding: needs to be discussed  - Contraception plans: needs to be discussed      RTC in 1 week    15 minutes spent on the date of the encounter, doing chart review, history and exam, documentation and further activities as noted.      Carmen Barragan CNM on 8/10/2021 at 8:30 AM

## 2021-08-10 NOTE — PROGRESS NOTES
"[facility]  Genetic Counseling Consult    Patient:  Berto Groves YOB: 1980   Date of Service:  8/10/21      [facility] \"Erin\" Emanuel Groves was seen at the [facility] for genetic consultation.  The indication for genetic counseling is  genetics evaluation and testing. She was accompanied by her .    The encounter was conducted with an ipad Sami  (#[id number]) due to the patient speaking limited english       Impression/Plan:   Erin had a genetic counseling session only after her ultrasound. We reviewed that ultrasound findings throughout the pregnancy have been suggestive of a genetic condition, likely trisomy 21, also known as Down syndrome. We discussed the nature of chromosomes, sporadic vs inherited, genetic conditions, and possible features.     Cord blood genetic testing (microarray and chromosomes) was recommended and offered. The patient declined cord blood testing. However, she would open to an inpatient genetic consult and an order was placed in fetal chart. We reviewed that the genetics consult will most likely lead to the recommendation of genetic testing while baby is in the hospital. Erin was more comfortable with this plan and I encouraged them to reach out if they had a additional questions.    Pregnancy History:   /Parity:     Age at Delivery: 41 year old  NAREN: 9/3/2021, Alternate NAREN Entry  Gestational Age: 36w4d    Family History:   A three-generation pedigree was not obtained due our focus on other topics.  However, both Erin and her  are healthy along with four healthy children. They also report that no one in their families have life-long health conditions and there has been no babies with bird defects or extra healthcare or surgery needs.        Risk Assessment for Chromosome Conditions:   We discussed chromosome conditions by first " reviewing chromosomes which contain DNA, or instructions for the body's development. The typical individual has 46 chromosomes in 23 pairs. Pairs 1-22 are the same for males and females. The last pair (23rd pair) of chromosomes are different for male and females. Females will have two chromosomes called X and males have one X and one Y. So the typical individual's genetic information is often describes as 46, XX or 46, XY.     During the creation of a gamete (egg or sperm), an individual's 46 chromosomes (23 pairs) divide and organize so there is one chromosome from each pair. Therefore, in typical conceptions an embryo is formed when the sperm fertilizes the egg and the 23 chromosomes from each combine and total the typical 46 chromosomes. However, if a mistake (called non-disjunction) happens in organizing the chromosomes (process called meiosis) an egg or sperm could be missing a chromosome from a pair or have an extra chromosome from a pair. Since people with ovaries are born with all of their eggs, the age of the eggs are as old as the person. With increasing age, the eggs are more likely to make this mistake in meiosis and this causes age related aneuploidy (atypical number of chromosomes) risks.     We discussed that genetic conditions are caused by differences in genetic information. In the case of aneuploidy, like trisomy 21 (also called Down syndrome) the difference is the amount of genetic material. In trisomy 21 there is extra genetic material because there is three copies of chromosome 21. There are also conditions caused by microdeletions which occur when just part of a chromosome is missing. There are also conditions that have the correct amount of information but an error within the instruction, like a spelling error within a sentence.     We discussed that health problems that affect multiple organ and body systems are often due to a problem with the instructions, or in other words a genetic  "condition. This means the genetic information is different (amount or information) in each part of the body. Furthermore, since the difference will always be with that individual, the condition is lifelong and often impossible to \"cure\" or make go away. We discussed the difference between genetic and hereditary. Some genetic conditions are inherited from a parent who has the condition or is a carrier for the condition. In these cases at least half or all of the parents egg or sperm has the genetic difference. In other cases, like trisomy 21, the genetic difference is new and just occurred in that egg or sperm. In this case the information was changed from the parent's original DNA so it was not hereditary. Furthermore, a change like that in one egg or sperm does not mean all the other eggs or sperm that individual creates will be abnormal. We discussed this may explain why Erin has four healthy children and there are no other family members with health problems. Regardless of how their son's genetic difference occurred, if we detect it in his blood, it is most likely in most or all of his cells (parts of body). This means the genetic difference is likely in sperm he will create so his children could inherit the condition.     We reviewed that the risk for fetal chromosome abnormalities increases with maternal age.       At age 41 at midtrimester, the risk to have a baby with Down syndrome is 1 in 31 or 3.22%    At age 41 at midtrimester, the risk to have a baby with any chromosome abnormality is 1 in 19 or 5.26%    Erin did not have maternal serum screening earlier in pregnancy.    Erin's earlier ultrasound at  20w1d identified an absent nasal bone. An absent nasal bone can be seen in 0.3-0.7% fetus without Down syndrome. However, in the second trimester ultrasound about 30-40% of fetuses with Down syndrome have an absent nasal bone.     We discussed the following risk adjustment:  Age-related aneuploidy risk  1 " in 31 (3.22%)     Likelihood ratio   41.1     Modified aneuploidy risk  1 in 0.75 (>99%)       In addition, Erin's ultrasounds also have findings of heart defect and fetal hydrops. There is a high chance the baby has trisomy 21 (Down syndrome). We discussed that genetic testing will help us determine the baby's diagnosis and that will help us better understand the other health problems or health care baby may need. Erin's  had excellent questions about ultrasound findings. Along with Dr. Michel we discussed the findings. We also reviewed that genetic conditions can often have features or health problems that cannot be seen on ultrasound.     Testing Options:   After reviewing the above information we discussed cord blood genetic testing after delivery. Erin ultimately decided to NOT consent for cord blood. However, she is open to an in-patient genetic consult and orders will be placed in the fetal chart. We discussed that a  (healthcare provider specializes in care of babies and kids with genetic health conditions) will meet them and their baby boy while they stay in the hospital. The  may do a physical exam and recommend genetic testing that may help them understand baby's health problems.     It was a pleasure to be involved with [facility] care. Face-to-face time of the meeting was 45 minutes.      Izabella Bob MS, [facility]  Licensed Genetic Counselor   [facility]  Maternal Fetal Medicine  [email or web address]  [email or web address]  Office: [phone]  Pager [phone]  MFM: [phone]   Fax: [phone]

## 2021-08-10 NOTE — PROGRESS NOTES
"Please see \"Imaging\" tab under \"Chart Review\" for details of today's visit.    Brandon Michel    "

## 2021-08-11 LAB
GP B STREP DNA SPEC QL NAA+PROBE: POSITIVE
PATIENT PENICILLIN, AMOXICILLIN, CEPHALOSPORINS ALLERGY: NO

## 2021-08-17 ENCOUNTER — OFFICE VISIT (OUTPATIENT)
Dept: MATERNAL FETAL MEDICINE | Facility: CLINIC | Age: 41
End: 2021-08-17
Attending: OBSTETRICS & GYNECOLOGY
Payer: COMMERCIAL

## 2021-08-17 ENCOUNTER — HOSPITAL ENCOUNTER (OUTPATIENT)
Dept: ULTRASOUND IMAGING | Facility: CLINIC | Age: 41
End: 2021-08-17
Attending: OBSTETRICS & GYNECOLOGY
Payer: COMMERCIAL

## 2021-08-17 VITALS
WEIGHT: 144.7 LBS | HEART RATE: 79 BPM | BODY MASS INDEX: 25.23 KG/M2 | OXYGEN SATURATION: 99 % | DIASTOLIC BLOOD PRESSURE: 49 MMHG | RESPIRATION RATE: 18 BRPM | SYSTOLIC BLOOD PRESSURE: 107 MMHG

## 2021-08-17 DIAGNOSIS — O35.BXX0 ANOMALY OF HEART OF FETUS AFFECTING PREGNANCY, ANTEPARTUM, SINGLE OR UNSPECIFIED FETUS: ICD-10-CM

## 2021-08-17 DIAGNOSIS — O09.93 SUPERVISION OF HIGH RISK PREGNANCY IN THIRD TRIMESTER: Primary | ICD-10-CM

## 2021-08-17 DIAGNOSIS — O35.10X0 SUSPECTED FETAL CHROMOSOME ANOMALY AFFECTING ANTEPARTUM CARE OF MOTHER, SINGLE GESTATION: Primary | ICD-10-CM

## 2021-08-17 PROCEDURE — 76819 FETAL BIOPHYS PROFIL W/O NST: CPT | Mod: 26 | Performed by: OBSTETRICS & GYNECOLOGY

## 2021-08-17 PROCEDURE — 99212 OFFICE O/P EST SF 10 MIN: CPT | Mod: 25 | Performed by: ADVANCED PRACTICE MIDWIFE

## 2021-08-17 PROCEDURE — 76816 OB US FOLLOW-UP PER FETUS: CPT | Mod: 26 | Performed by: OBSTETRICS & GYNECOLOGY

## 2021-08-17 PROCEDURE — 76816 OB US FOLLOW-UP PER FETUS: CPT

## 2021-08-17 PROCEDURE — G0463 HOSPITAL OUTPT CLINIC VISIT: HCPCS | Mod: 25

## 2021-08-17 PROCEDURE — 76819 FETAL BIOPHYS PROFIL W/O NST: CPT

## 2021-08-17 NOTE — NURSING NOTE
Erin seen in clinic today for follow up ultrasound, hydrops check, BPP, OB visit at 37w4d gestation due to pregnancy c/b fetal CHD, hydrops, and suspected T21 (see report/notes). icanbuy ipad  utilized for visit. VSS. Pt reports + FM. Pt denies bldg/lof/change in discharge/contractions/headache/vision changes/chest pain/SOB/edema. Carmen Barragan CNM met with pt and discussed POC. Plan to continue 2x/week hydrops/BPP checks, weekly OB visits. Pt discharged stable and ambulatory.     Brittni Chiang RN

## 2021-08-17 NOTE — PROGRESS NOTES
"Maternal fetal Medicine OB Follow up visit.     Berto Groves  : 1980  MRN: [id number]    CC: OB Follow-up    Subjective:  Berto Groves is a 41 year old  at 37w4d presenting for routine OB follow-up. Today, she is feeling well, but has noticed increased pelvic pressure and contractions. Requests vaginal exam.     Patient denies regular, painful contractions, denies loss of fluid or vaginal bleeding.  Reports fetal movement.        OB Hx:  OB History    Para Term  AB Living   5 4 0 0 0 0   SAB TAB Ectopic Multiple Live Births   0 0 0 0 4      # Outcome Date GA Lbr Michael/2nd Weight Sex Delivery Anes PTL Lv   5 Current            4 Para            3 Para            2 Para            1 Para                  Objective:  /49 (BP Location: Left arm, Patient Position: Chair)   Pulse 79   Resp 18   Wt 65.6 kg (144 lb 11.2 oz)   LMP 2020 (Exact Date)   SpO2 99%   BMI 25.23 kg/m      Gen: alert, oriented, NAD  Skin: warm, dry, intact  Respiratory: breathing unlabored, no SOB  Abdominal: gravid, non-tender  Pelvic: external genitalia WNL. SVE: 1/50/-2, soft, posterior  Extremities: WNL  Psych: mood WNL, behavior WNL      OB Ultrasound:  Please see \"imaging\" tab under chart review for today's ultrasound results.      Assessment/Plan:  41 year old  at 37w4d here for follow OB visit.    Pregnancy has been complicated by:   Maternal dx:  - AMA     Fetal dx:  1. Unbalanced AV canal defect   2. Pleural effusion- right side  3. Pericardial effusion  4. Absent nasal bone           #CHD:  Echo : Unbalanced left dominant atrioventricular canal defect with large primum  atrial and inlet ventricular septal defects. Trivial common atrioventricular  valve insufficiency. The right ventricle is at least moderately hypoplastic;  normal systolic function. Normal left ventricular size and systolic function.  Unobstructed right and left ventricular " outflow tracts; the pulmonary and  aortic valve appears mildly thickened without flow turbulence; normal ductus  arteriosus flow. The aortic arch is unobstructed. Small pericardial and  pleural effusions.  - S/p cards and NICU counseling  - NICU admission with echo shortly after birth     #Routine PNC:  - Prenatal labs:  Rh: +  antibody: neg               HepB/HIV/RPR: nonreactive               Rubella: immune                 GCT: pass               UC: no growth  - Pap smear: NIL and HPV neg 2021  - Immunizations:  s/p TDap; declines Covid vaccine while pregnant  - Genetic screening: declined. Does desire inpatient genetic consult, but declined cord blood testing after delivery.  - GBS positive     # Surveillance:  - Twice weekly BPP with hydrops check     #Delivery planning:  - IOL at 37wks has been recommended, but patient declines and prefers to await spontaneous labor. Is agreeable to IOL if still pregnant by due date.   - Labor pain management: no pharmacologic pain medications desired  - Feeding: breastfeeding  - Contraception plans: initially signed consent for PPTL, but is now reconsidering. Does not want any future pregnancies. Reviewed all forms of contraception and patient likely interested in IUD or Nexplanon. Handouts given and can be placed at primary OB office.      15 minutes spent on the date of the encounter, doing chart review, history and exam, documentation and further activities as noted.      Carmen Barragan CNM on 2021 at 10:05 AM

## 2021-08-17 NOTE — PROGRESS NOTES
The patient was seen for an ultrasound in the [facility] Medicine Center at the [facility] today.  For a detailed report of the ultrasound examination, please see the ultrasound report which can be found under the imaging tab.    Rubina Garcia MD  , OB/GYN  Maternal-Fetal Medicine  [phone] (Pager)

## 2021-08-17 NOTE — PATIENT INSTRUCTIONS
Patient Education     Birth Control: IUD (Intrauterine Device)    The IUD (intrauterine device) is small, flexible, and T-shaped. A trained healthcare provider places it in the uterus. The IUD is one of the most effective birth control methods. It's also reversible. This means it can be removed at any time by a trained healthcare provider. New IUDs are safe and don't have the risks of older types of IUDs.   Pregnancy rates  Talk to your healthcare provider about the effectiveness of this birth control method.  Types of IUDs  IUD insertion is done in the healthcare provider s office. Two types of IUDs are available:    The copper IUD releases a small amount of copper into the uterus. The copper makes it harder for sperm to reach the egg. The device works for at least 10 years.    The progestin IUD releases a hormone called progestin. It causes changes in the uterus to help prevent pregnancy. The device works for 3 to 5 years, depending on which device is chosen. It may be recommended for women who have anemia or heavy and painful periods.  IUDs have thin strings that hang from the opening of the uterus into the vagina. This lets you check that the IUD stays in place.   Things to know about IUDs    IUDs can be used by women who have never been pregnant or by women with a history of sexually transmitted infections (STIs) or tubal pregnancy.    It won't move from the uterus to any other part of the body.    There is a slight risk of the device coming out of the vagina (expulsion).    It may not work in women who have an abnormally shaped uterus.    A copper IUD may cause heavier periods and cramping.    Progestin IUD may cause light periods or no periods at all (irregular bleeding or spotting is possible and normal during first 3 to 6 months).    If you get a sexually transmitted infection with an IUD in place, symptoms may be more severe.    What to report to your healthcare provider  Be sure your healthcare provider  knows if you have:    A sexually transmitted infection (STI) or possible STI    Liver problems    Blood clots (for progestin IUD only)    Breast cancer or a history of breast cancer (progestin IUD only)  [employer] last reviewed this educational content on 5/1/2020 2000-2021 [employer]. All rights reserved. This information is not intended as a substitute for professional medical care. Always follow your healthcare professional's instructions.           Patient Education     Nexplanon Drug Implant 68 mg  Uses  For birth control.  Instructions  Two bandages will cover the area where the mihai is placed. Leave the larger, outer bandage on for 24 hours. Leave the smaller bandage in place for 3-5 days, as instructed by your doctor.  Keep the bandages clean and dry.  This medicine is normally placed under the skin of the upper arm, usually in the arm you do not write with.  A negative pregnancy test may be needed before receiving this medicine.  A second form of birth control may be needed for the first week after the mihai is placed. Ask your doctor or pharmacist about the need for back-up birth control.  Keep the card that includes the date and place the mihai was inserted.  The mihai must be removed after 3 years.  This medicine may cause dark patches to appear on your face. Avoid sunlight and use sunscreen lotion to minimize further darkening of these skin patches.  Avoid prolonged or excessive sunlight exposure. Use sunscreen lotion with SPF 15 or higher.  Please tell your doctor and pharmacist about all the medicines you take. Include both prescription and over-the-counter medicines. Also tell them about any vitamins, herbal medicines, or anything else you take for your health.  It is very important that you follow your doctor's instructions for all blood tests.  It is very important that you keep all appointments for medical exams and tests while on this medicine.  Cautions  Some patients taking this  medicine have experienced serious side effects. Please speak with your doctor to understand the risks and benefits associated with this medicine.  This medicine is associated with an increased risk for serious blood clots. Speak with your doctor about the benefits and risks from using this medicine.  This medicine is associated with an increased risk of serious heart problems, heart attack, and stroke. Please speak with your doctor about the risks and benefits of using this medicine. Contact your doctor immediately if you experience chest pain or difficulty breathing.  Tell your doctor and pharmacist if you ever had an allergic reaction to a medicine. Symptoms of an allergic reaction can include trouble breathing, skin rash, itching, swelling, or severe dizziness.  This medicine may not work as well in women who are very overweight. Speak to your doctor about any need to reduce weight.  Your ability to stay alert or to react quickly may be impaired by this medicine. Do not drive or operate machinery until you know how this medicine will affect you.  Please check with your doctor before drinking alcohol while on this medicine.  Avoid smoking while on this medicine. Smoking may increase your risk for stroke, heart attack, blood clots, high blood pressure, and other diseases of the heart and blood vessels.  Ask your doctor to show you how to perform a self breast examination. You should check your breasts once a month and report any changes to your doctor.  Talk to your doctor about getting a complete physical exam every year while on this medicine.  Check regularly to make sure you can feel the mihai under the skin. Contact your doctor right away if you can not feel it, or if it feels bent or broken.  Call the doctor if there are any signs of confusion or unusual changes in behavior.  Tell the doctor or pharmacist if you are pregnant, planning to be pregnant, or breastfeeding.  Do not use this medicine if you are  pregnant. If you become pregnant while on this medicine, contact your doctor immediately.  This medicine does not protect you or your partner against sexually transmitted diseases.  Do not take [place]'s wort while on this medicine.  Ask your pharmacist if this medicine can interact with any of your other medicines. Be sure to tell them about all the medicines you take.  Please tell all your doctors and dentists that you are on this medicine before they provide care.  Do not start or stop any other medicines without first speaking to your doctor or pharmacist.  Seek medical attention if you see any signs of a serious infection. These signs include pain, increasing redness or pus where this medicine is being used.  Side Effects  The following is a list of some common side effects from this medicine. Please speak with your doctor about what you should do if you experience these or other side effects.    acne    bloating    breast pain or swelling    dizziness    hair loss    headaches    high blood pressure    brown colored patches on the face    nausea    stomach upset or abdominal pain    vaginal bleeding or spotting between periods    vaginal itching or discharge    weight gain  Call your doctor or get medical help right away if you notice any of these more serious side effects:    increased risk of a blood clot    chest pain    confusion    coughing up blood or vomit that looks like coffee grounds    depression or feeling sad    fainting    severe or persistent headache    fast or irregular heart beats    jaw pain    sudden leg pain, swelling, warmth or redness    mood changes    shortness of breath    stomach pain    symptoms of stroke (such as one-sided weakness, slurred speech, confusion)    sweating    unusual or unexplained tiredness or weakness    dark urine    cramping of the uterus or bleeding from the vagina    blurring or changes of vision    pain, heat, swelling or redness at the incision  site    yellowing of the eyes    yellowing of the skin  A few people may have an allergic reactions to this medicine. Symptoms can include difficulty breathing, skin rash, itching, swelling, or severe dizziness. If you notice any of these symptoms, seek medical help quickly.  Extra  Please speak with your doctor, nurse, or pharmacist if you have any questions about this medicine.  [email or web address]  IMPORTANT NOTE: This document tells you briefly how to take your medicine, but it does not tell you all there is to know about it.Your doctor or pharmacist may give you other documents about your medicine. Please talk to them if you have any questions.Always follow their advice. There is a more complete description of this medicine available in English.Scan this code on your smartphone or tablet or use the web address below. You can also ask your pharmacist for a printout. If you have any questions, please ask your pharmacist.     2021 [employer].

## 2021-08-19 ENCOUNTER — HOSPITAL ENCOUNTER (INPATIENT)
Facility: CLINIC | Age: 41
LOS: 2 days | Discharge: HOME HEALTH | End: 2021-08-21
Attending: OBSTETRICS & GYNECOLOGY | Admitting: OBSTETRICS & GYNECOLOGY
Payer: COMMERCIAL

## 2021-08-19 PROBLEM — Z34.90 ENCOUNTER FOR INDUCTION OF LABOR: Status: ACTIVE | Noted: 2021-08-19

## 2021-08-19 LAB
ABO/RH(D): NORMAL
ANTIBODY SCREEN: NEGATIVE
BASOPHILS # BLD AUTO: 0 10E3/UL (ref 0–0.2)
BASOPHILS NFR BLD AUTO: 0 %
EOSINOPHIL # BLD AUTO: 0 10E3/UL (ref 0–0.7)
EOSINOPHIL NFR BLD AUTO: 0 %
ERYTHROCYTE [DISTWIDTH] IN BLOOD BY AUTOMATED COUNT: 13.4 % (ref 10–15)
HCT VFR BLD AUTO: 37.5 % (ref 35–47)
HGB BLD-MCNC: 12.6 G/DL (ref 11.7–15.7)
IMM GRANULOCYTES # BLD: 0.1 10E3/UL
IMM GRANULOCYTES NFR BLD: 1 %
LYMPHOCYTES # BLD AUTO: 1.6 10E3/UL (ref 0.8–5.3)
LYMPHOCYTES NFR BLD AUTO: 16 %
MCH RBC QN AUTO: 32.3 PG (ref 26.5–33)
MCHC RBC AUTO-ENTMCNC: 33.6 G/DL (ref 31.5–36.5)
MCV RBC AUTO: 96 FL (ref 78–100)
MONOCYTES # BLD AUTO: 0.4 10E3/UL (ref 0–1.3)
MONOCYTES NFR BLD AUTO: 4 %
NEUTROPHILS # BLD AUTO: 8.1 10E3/UL (ref 1.6–8.3)
NEUTROPHILS NFR BLD AUTO: 79 %
NRBC # BLD AUTO: 0 10E3/UL
NRBC BLD AUTO-RTO: 0 /100
PLATELET # BLD AUTO: 231 10E3/UL (ref 150–450)
RBC # BLD AUTO: 3.9 10E6/UL (ref 3.8–5.2)
SARS-COV-2 RNA RESP QL NAA+PROBE: NEGATIVE
SPECIMEN EXPIRATION DATE: NORMAL
T PALLIDUM AB SER QL: NONREACTIVE
WBC # BLD AUTO: 10.2 10E3/UL (ref 4–11)

## 2021-08-19 PROCEDURE — 120N000002 HC R&B MED SURG/OB UMMC

## 2021-08-19 PROCEDURE — 86780 TREPONEMA PALLIDUM: CPT | Performed by: STUDENT IN AN ORGANIZED HEALTH CARE EDUCATION/TRAINING PROGRAM

## 2021-08-19 PROCEDURE — 722N000001 HC LABOR CARE VAGINAL DELIVERY SINGLE

## 2021-08-19 PROCEDURE — 258N000003 HC RX IP 258 OP 636: Performed by: STUDENT IN AN ORGANIZED HEALTH CARE EDUCATION/TRAINING PROGRAM

## 2021-08-19 PROCEDURE — 250N000013 HC RX MED GY IP 250 OP 250 PS 637: Performed by: OBSTETRICS & GYNECOLOGY

## 2021-08-19 PROCEDURE — 250N000009 HC RX 250

## 2021-08-19 PROCEDURE — 10907ZC DRAINAGE OF AMNIOTIC FLUID, THERAPEUTIC FROM PRODUCTS OF CONCEPTION, VIA NATURAL OR ARTIFICIAL OPENING: ICD-10-PCS | Performed by: OBSTETRICS & GYNECOLOGY

## 2021-08-19 PROCEDURE — G0463 HOSPITAL OUTPT CLINIC VISIT: HCPCS

## 2021-08-19 PROCEDURE — 250N000011 HC RX IP 250 OP 636: Performed by: STUDENT IN AN ORGANIZED HEALTH CARE EDUCATION/TRAINING PROGRAM

## 2021-08-19 PROCEDURE — 85025 COMPLETE CBC W/AUTO DIFF WBC: CPT | Performed by: STUDENT IN AN ORGANIZED HEALTH CARE EDUCATION/TRAINING PROGRAM

## 2021-08-19 PROCEDURE — 250N000011 HC RX IP 250 OP 636: Performed by: OBSTETRICS & GYNECOLOGY

## 2021-08-19 PROCEDURE — 86901 BLOOD TYPING SEROLOGIC RH(D): CPT | Performed by: STUDENT IN AN ORGANIZED HEALTH CARE EDUCATION/TRAINING PROGRAM

## 2021-08-19 PROCEDURE — U0003 INFECTIOUS AGENT DETECTION BY NUCLEIC ACID (DNA OR RNA); SEVERE ACUTE RESPIRATORY SYNDROME CORONAVIRUS 2 (SARS-COV-2) (CORONAVIRUS DISEASE [COVID-19]), AMPLIFIED PROBE TECHNIQUE, MAKING USE OF HIGH THROUGHPUT TECHNOLOGIES AS DESCRIBED BY CMS-2020-01-R: HCPCS | Performed by: STUDENT IN AN ORGANIZED HEALTH CARE EDUCATION/TRAINING PROGRAM

## 2021-08-19 PROCEDURE — 88307 TISSUE EXAM BY PATHOLOGIST: CPT | Mod: TC | Performed by: STUDENT IN AN ORGANIZED HEALTH CARE EDUCATION/TRAINING PROGRAM

## 2021-08-19 RX ORDER — MISOPROSTOL 200 UG/1
800 TABLET ORAL
Status: DISCONTINUED | OUTPATIENT
Start: 2021-08-19 | End: 2021-08-21 | Stop reason: HOSPADM

## 2021-08-19 RX ORDER — PENICILLIN G POTASSIUM 5000000 [IU]/1
5 INJECTION, POWDER, FOR SOLUTION INTRAMUSCULAR; INTRAVENOUS ONCE
Status: COMPLETED | OUTPATIENT
Start: 2021-08-19 | End: 2021-08-19

## 2021-08-19 RX ORDER — ACETAMINOPHEN 325 MG/1
650 TABLET ORAL EVERY 4 HOURS PRN
Status: DISCONTINUED | OUTPATIENT
Start: 2021-08-19 | End: 2021-08-21 | Stop reason: HOSPADM

## 2021-08-19 RX ORDER — DOCUSATE SODIUM 100 MG/1
100 CAPSULE, LIQUID FILLED ORAL DAILY
Status: DISCONTINUED | OUTPATIENT
Start: 2021-08-19 | End: 2021-08-21 | Stop reason: HOSPADM

## 2021-08-19 RX ORDER — OXYTOCIN 10 [USP'U]/ML
10 INJECTION, SOLUTION INTRAMUSCULAR; INTRAVENOUS
Status: DISCONTINUED | OUTPATIENT
Start: 2021-08-19 | End: 2021-08-19 | Stop reason: HOSPADM

## 2021-08-19 RX ORDER — MODIFIED LANOLIN
OINTMENT (GRAM) TOPICAL
Status: DISCONTINUED | OUTPATIENT
Start: 2021-08-19 | End: 2021-08-21 | Stop reason: HOSPADM

## 2021-08-19 RX ORDER — ONDANSETRON 2 MG/ML
4 INJECTION INTRAMUSCULAR; INTRAVENOUS EVERY 6 HOURS PRN
Status: DISCONTINUED | OUTPATIENT
Start: 2021-08-19 | End: 2021-08-19 | Stop reason: HOSPADM

## 2021-08-19 RX ORDER — METHYLERGONOVINE MALEATE 0.2 MG/ML
200 INJECTION INTRAVENOUS
Status: DISCONTINUED | OUTPATIENT
Start: 2021-08-19 | End: 2021-08-21 | Stop reason: HOSPADM

## 2021-08-19 RX ORDER — OXYTOCIN/0.9 % SODIUM CHLORIDE 30/500 ML
PLASTIC BAG, INJECTION (ML) INTRAVENOUS
Status: COMPLETED
Start: 2021-08-19 | End: 2021-08-19

## 2021-08-19 RX ORDER — PROCHLORPERAZINE 25 MG
25 SUPPOSITORY, RECTAL RECTAL EVERY 12 HOURS PRN
Status: DISCONTINUED | OUTPATIENT
Start: 2021-08-19 | End: 2021-08-19 | Stop reason: HOSPADM

## 2021-08-19 RX ORDER — NALOXONE HYDROCHLORIDE 0.4 MG/ML
0.4 INJECTION, SOLUTION INTRAMUSCULAR; INTRAVENOUS; SUBCUTANEOUS
Status: DISCONTINUED | OUTPATIENT
Start: 2021-08-19 | End: 2021-08-19 | Stop reason: HOSPADM

## 2021-08-19 RX ORDER — HYDROCORTISONE 2.5 %
CREAM (GRAM) TOPICAL 3 TIMES DAILY PRN
Status: DISCONTINUED | OUTPATIENT
Start: 2021-08-19 | End: 2021-08-21 | Stop reason: HOSPADM

## 2021-08-19 RX ORDER — KETOROLAC TROMETHAMINE 30 MG/ML
30 INJECTION, SOLUTION INTRAMUSCULAR; INTRAVENOUS
Status: DISCONTINUED | OUTPATIENT
Start: 2021-08-19 | End: 2021-08-19

## 2021-08-19 RX ORDER — FENTANYL CITRATE 50 UG/ML
50-100 INJECTION, SOLUTION INTRAMUSCULAR; INTRAVENOUS
Status: DISCONTINUED | OUTPATIENT
Start: 2021-08-19 | End: 2021-08-19 | Stop reason: HOSPADM

## 2021-08-19 RX ORDER — METOCLOPRAMIDE 10 MG/1
10 TABLET ORAL EVERY 6 HOURS PRN
Status: DISCONTINUED | OUTPATIENT
Start: 2021-08-19 | End: 2021-08-19 | Stop reason: HOSPADM

## 2021-08-19 RX ORDER — IBUPROFEN 800 MG/1
800 TABLET, FILM COATED ORAL EVERY 6 HOURS PRN
Status: DISCONTINUED | OUTPATIENT
Start: 2021-08-19 | End: 2021-08-21 | Stop reason: HOSPADM

## 2021-08-19 RX ORDER — METHYLERGONOVINE MALEATE 0.2 MG/ML
200 INJECTION INTRAVENOUS
Status: DISCONTINUED | OUTPATIENT
Start: 2021-08-19 | End: 2021-08-19 | Stop reason: HOSPADM

## 2021-08-19 RX ORDER — PENICILLIN G 3000000 [IU]/50ML
3 INJECTION, SOLUTION INTRAVENOUS EVERY 4 HOURS
Status: DISCONTINUED | OUTPATIENT
Start: 2021-08-19 | End: 2021-08-19 | Stop reason: HOSPADM

## 2021-08-19 RX ORDER — OXYTOCIN 10 [USP'U]/ML
10 INJECTION, SOLUTION INTRAMUSCULAR; INTRAVENOUS
Status: DISCONTINUED | OUTPATIENT
Start: 2021-08-19 | End: 2021-08-19

## 2021-08-19 RX ORDER — PROCHLORPERAZINE MALEATE 10 MG
10 TABLET ORAL EVERY 6 HOURS PRN
Status: DISCONTINUED | OUTPATIENT
Start: 2021-08-19 | End: 2021-08-19 | Stop reason: HOSPADM

## 2021-08-19 RX ORDER — CARBOPROST TROMETHAMINE 250 UG/ML
250 INJECTION, SOLUTION INTRAMUSCULAR
Status: DISCONTINUED | OUTPATIENT
Start: 2021-08-19 | End: 2021-08-21 | Stop reason: HOSPADM

## 2021-08-19 RX ORDER — OXYTOCIN 10 [USP'U]/ML
10 INJECTION, SOLUTION INTRAMUSCULAR; INTRAVENOUS
Status: DISCONTINUED | OUTPATIENT
Start: 2021-08-19 | End: 2021-08-21 | Stop reason: HOSPADM

## 2021-08-19 RX ORDER — IBUPROFEN 600 MG/1
600 TABLET, FILM COATED ORAL EVERY 6 HOURS PRN
Qty: 60 TABLET | Refills: 0 | Status: SHIPPED | OUTPATIENT
Start: 2021-08-19 | End: 2021-10-27

## 2021-08-19 RX ORDER — MISOPROSTOL 200 UG/1
400 TABLET ORAL
Status: DISCONTINUED | OUTPATIENT
Start: 2021-08-19 | End: 2021-08-21 | Stop reason: HOSPADM

## 2021-08-19 RX ORDER — TRANEXAMIC ACID 10 MG/ML
1 INJECTION, SOLUTION INTRAVENOUS EVERY 30 MIN PRN
Status: DISCONTINUED | OUTPATIENT
Start: 2021-08-19 | End: 2021-08-19 | Stop reason: HOSPADM

## 2021-08-19 RX ORDER — NALOXONE HYDROCHLORIDE 0.4 MG/ML
0.2 INJECTION, SOLUTION INTRAMUSCULAR; INTRAVENOUS; SUBCUTANEOUS
Status: DISCONTINUED | OUTPATIENT
Start: 2021-08-19 | End: 2021-08-19 | Stop reason: HOSPADM

## 2021-08-19 RX ORDER — AMOXICILLIN 250 MG
1 CAPSULE ORAL DAILY
Qty: 100 TABLET | Refills: 0 | Status: SHIPPED | OUTPATIENT
Start: 2021-08-19 | End: 2021-10-27

## 2021-08-19 RX ORDER — LIDOCAINE HYDROCHLORIDE 10 MG/ML
INJECTION, SOLUTION EPIDURAL; INFILTRATION; INTRACAUDAL; PERINEURAL
Status: DISCONTINUED
Start: 2021-08-19 | End: 2021-08-19 | Stop reason: HOSPADM

## 2021-08-19 RX ORDER — ONDANSETRON 4 MG/1
4 TABLET, ORALLY DISINTEGRATING ORAL EVERY 6 HOURS PRN
Status: DISCONTINUED | OUTPATIENT
Start: 2021-08-19 | End: 2021-08-19 | Stop reason: HOSPADM

## 2021-08-19 RX ORDER — OXYTOCIN/0.9 % SODIUM CHLORIDE 30/500 ML
100-340 PLASTIC BAG, INJECTION (ML) INTRAVENOUS CONTINUOUS PRN
Status: DISCONTINUED | OUTPATIENT
Start: 2021-08-19 | End: 2021-08-19

## 2021-08-19 RX ORDER — MISOPROSTOL 200 UG/1
TABLET ORAL
Status: DISCONTINUED
Start: 2021-08-19 | End: 2021-08-19 | Stop reason: HOSPADM

## 2021-08-19 RX ORDER — ACETAMINOPHEN 325 MG/1
650 TABLET ORAL EVERY 6 HOURS PRN
Qty: 100 TABLET | Refills: 0 | Status: SHIPPED | OUTPATIENT
Start: 2021-08-19 | End: 2022-11-21

## 2021-08-19 RX ORDER — OXYTOCIN/0.9 % SODIUM CHLORIDE 30/500 ML
340 PLASTIC BAG, INJECTION (ML) INTRAVENOUS CONTINUOUS PRN
Status: DISCONTINUED | OUTPATIENT
Start: 2021-08-19 | End: 2021-08-19 | Stop reason: HOSPADM

## 2021-08-19 RX ORDER — TRANEXAMIC ACID 10 MG/ML
1 INJECTION, SOLUTION INTRAVENOUS EVERY 30 MIN PRN
Status: DISCONTINUED | OUTPATIENT
Start: 2021-08-19 | End: 2021-08-21 | Stop reason: HOSPADM

## 2021-08-19 RX ORDER — CITRIC ACID/SODIUM CITRATE 334-500MG
30 SOLUTION, ORAL ORAL ONCE
Status: DISCONTINUED | OUTPATIENT
Start: 2021-08-19 | End: 2021-08-19 | Stop reason: HOSPADM

## 2021-08-19 RX ORDER — BISACODYL 10 MG
10 SUPPOSITORY, RECTAL RECTAL DAILY PRN
Status: DISCONTINUED | OUTPATIENT
Start: 2021-08-19 | End: 2021-08-21 | Stop reason: HOSPADM

## 2021-08-19 RX ORDER — OXYTOCIN/0.9 % SODIUM CHLORIDE 30/500 ML
340 PLASTIC BAG, INJECTION (ML) INTRAVENOUS CONTINUOUS PRN
Status: DISCONTINUED | OUTPATIENT
Start: 2021-08-19 | End: 2021-08-21 | Stop reason: HOSPADM

## 2021-08-19 RX ORDER — MISOPROSTOL 200 UG/1
800 TABLET ORAL
Status: DISCONTINUED | OUTPATIENT
Start: 2021-08-19 | End: 2021-08-19 | Stop reason: HOSPADM

## 2021-08-19 RX ORDER — IBUPROFEN 600 MG/1
600 TABLET, FILM COATED ORAL
Status: DISCONTINUED | OUTPATIENT
Start: 2021-08-19 | End: 2021-08-19

## 2021-08-19 RX ORDER — METOCLOPRAMIDE HYDROCHLORIDE 5 MG/ML
10 INJECTION INTRAMUSCULAR; INTRAVENOUS EVERY 6 HOURS PRN
Status: DISCONTINUED | OUTPATIENT
Start: 2021-08-19 | End: 2021-08-19 | Stop reason: HOSPADM

## 2021-08-19 RX ORDER — ACETAMINOPHEN 325 MG/1
650 TABLET ORAL EVERY 4 HOURS PRN
Status: DISCONTINUED | OUTPATIENT
Start: 2021-08-19 | End: 2021-08-19 | Stop reason: HOSPADM

## 2021-08-19 RX ORDER — CARBOPROST TROMETHAMINE 250 UG/ML
250 INJECTION, SOLUTION INTRAMUSCULAR
Status: DISCONTINUED | OUTPATIENT
Start: 2021-08-19 | End: 2021-08-19 | Stop reason: HOSPADM

## 2021-08-19 RX ORDER — PENICILLIN G POTASSIUM 5000000 [IU]/1
5 INJECTION, POWDER, FOR SOLUTION INTRAMUSCULAR; INTRAVENOUS ONCE
Status: DISCONTINUED | OUTPATIENT
Start: 2021-08-19 | End: 2021-08-19 | Stop reason: HOSPADM

## 2021-08-19 RX ORDER — MISOPROSTOL 200 UG/1
400 TABLET ORAL
Status: DISCONTINUED | OUTPATIENT
Start: 2021-08-19 | End: 2021-08-19 | Stop reason: HOSPADM

## 2021-08-19 RX ADMIN — FENTANYL CITRATE 50 MCG: 50 INJECTION, SOLUTION INTRAMUSCULAR; INTRAVENOUS at 08:31

## 2021-08-19 RX ADMIN — DOCUSATE SODIUM 100 MG: 100 CAPSULE, LIQUID FILLED ORAL at 16:45

## 2021-08-19 RX ADMIN — KETOROLAC TROMETHAMINE 30 MG: 30 INJECTION, SOLUTION INTRAMUSCULAR; INTRAVENOUS at 15:12

## 2021-08-19 RX ADMIN — Medication 2 UNITS: at 14:27

## 2021-08-19 RX ADMIN — PENICILLIN G POTASSIUM 5 MILLION UNITS: 5000000 POWDER, FOR SOLUTION INTRAMUSCULAR; INTRAPLEURAL; INTRATHECAL; INTRAVENOUS at 08:53

## 2021-08-19 RX ADMIN — ACETAMINOPHEN 650 MG: 325 TABLET, FILM COATED ORAL at 16:45

## 2021-08-19 RX ADMIN — PENICILLIN G 3 MILLION UNITS: 3000000 INJECTION, SOLUTION INTRAVENOUS at 13:07

## 2021-08-19 RX ADMIN — FENTANYL CITRATE 100 MCG: 50 INJECTION, SOLUTION INTRAMUSCULAR; INTRAVENOUS at 10:08

## 2021-08-19 RX ADMIN — SODIUM CHLORIDE, POTASSIUM CHLORIDE, SODIUM LACTATE AND CALCIUM CHLORIDE 1000 ML: 600; 310; 30; 20 INJECTION, SOLUTION INTRAVENOUS at 08:30

## 2021-08-19 ASSESSMENT — ACTIVITIES OF DAILY LIVING (ADL)
FALL_HISTORY_WITHIN_LAST_SIX_MONTHS: NO
CONCENTRATING,_REMEMBERING_OR_MAKING_DECISIONS_DIFFICULTY: NO
HEARING_DIFFICULTY_OR_DEAF: NO
DOING_ERRANDS_INDEPENDENTLY_DIFFICULTY: NO
WEAR_GLASSES_OR_BLIND: NO
WALKING_OR_CLIMBING_STAIRS_DIFFICULTY: NO
DIFFICULTY_EATING/SWALLOWING: NO
DIFFICULTY_COMMUNICATING: NO
TOILETING_ISSUES: NO
DRESSING/BATHING_DIFFICULTY: NO

## 2021-08-19 ASSESSMENT — MIFFLIN-ST. JEOR: SCORE: 1277.31

## 2021-08-19 NOTE — PLAN OF CARE
Pt to BP for labor eval. Ltd English, iPad used, awaiting in person interp. Reports other labors short, 1.5-3 hours, all in [place]. Reports mucousy discharge, no LOF, some blood on toilet paper after wiping, no blood on glove with SVE. 1.5/30/high, unsure of presenting part, US confirmed vertex. Intake completed. No pain meds with other labors, may consider something this time. Reviewed options. NICU updated as to pt's arrival. Fentanyl given for pain control, pt will request when necessary. Abx for GBS+ status administering now. Cont IP cares.

## 2021-08-19 NOTE — PLAN OF CARE
She speaks some English - but  necessary for anything complicated.  is fluent in English and translates for her, by choice of patient, when he is here.     VSS and postpartum assessments WDL.  Up ad gem with slow but steady gait.  Independent with cares.  Bonding well with infant, Daniel, in the NICU.  Pumping with encouragement; she  her other children, but this is her first time pumping (next oldest child is 9yo).  Pain managed with tylenol, ibuprofen and hot packs.  PIV in left.  Duglas is present and very supportive.  Will continue with postpartum cares and education per plan of care.

## 2021-08-19 NOTE — PROGRESS NOTES
"[facility]  Labor Progress Note    S:  Patient is doing well at this time. Feeling increasing discomfort from contractions. Comfortable with SVE.     O:   Patient Vitals for the past 4 hrs:   Height Weight   21 0808 1.6 m (5' 3\") 65.3 kg (144 lb)     SVE: 7/-3    FHT: Baseline 145, moderate variability, positive accelerations, no decelerations  [place]: 3 contractions in 10 minutes    A/P:  Ms. Berto Groves is a 41 year old  at 37w6d by LMP c/w 9w US - here in spontaneous labor. Pregnancy complicated by congenital fetal anomalies with CHD.     Labor: - Spontaneous progression - can consider IV pitocin for augmentation if contractions space   FWB: - Category I FHT  PNC: - Rh positive, Rubella immune, GBS positive s/p adequate PCN, GCT passed     Samantha Perez MD  OB/GYN PGY-2  21 11:26 AM    Note late entry secondary to patient care.               "

## 2021-08-19 NOTE — PLAN OF CARE
Pt labored well,  providing supportive cares. Pushed with encouragement,delivering live male at 1448, see NICU notes. Stable immediately postpartum, toradol given. Bedside report to CEDRIC Pugh RN.

## 2021-08-19 NOTE — DISCHARGE SUMMARY
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  - GBS positive           Discharge Diagnosis:     -IUP at 37w6d, now delivered          Procedures:     Procedure(s): -            Medications Prior to Admission:     Medications Prior to Admission   Medication Sig Dispense Refill Last Dose    Prenatal Vit-Fe Fumarate-FA (PRENATAL VITAMIN PLUS LOW IRON) 27-1 MG TABS Take 1 tablet by mouth daily 100 tablet 3     pyridOXINE (VITAMIN B6) 25 MG tablet Take 1 tablet (25 mg) by mouth 3 times daily as needed (nausea) (Patient not taking: Reported on 2021) 90 tablet 1              Discharge Medications:        Review of your medicines        UNREVIEWED medicines. Ask your doctor about these medicines        Dose / Directions   Prenatal Vitamin Plus Low Iron 27-1 MG Tabs  Used for: Pregnancy test positive      Dose: 1 tablet  Take 1 tablet by mouth daily  Quantity: 100 tablet  Refills: 3     pyridOXINE 25 MG tablet  Commonly known as: VITAMIN B6  Used for: Nausea and vomiting during pregnancy      Dose: 25 mg  Take 1 tablet (25 mg) by mouth 3 times daily as needed (nausea)  Quantity: 90 tablet  Refills: 1                    Consultations:   NICU          Brief Admission History   Berto Groves is a 41 year old  at 37w6d by LMP c/w 9w1d US who presents today with painful, regular contractions. Pregnancy complicated by below concerns.      The patient states that yesterday she started to experience painful, regular contractions. Has history of  x4 (deliveries at home in the [place], uncomplicated at term per patient) with precipitous labor.  Given concern for imminent delivery, patient presented for further care.      Currently the patient reports good fetal movement. Denies LOF. [place]-tinged vaginal discharge. She denies fever, chills, SOB, chest pain, palpitations, N/V, LE swelling/tenderness.  No concerns for headache, vision changes, RUQ or epigastric pain. All other ROS negative.             Brief Intrapartum Course:   She progressed with complete and with good effort delivered a vigorous infant from. Shoulders delivered easily. No nuchal cord. Placed on mother's chest. Infant was vigorous, so cord was cut after 60 second delay. APGAR of 6 and 7 at 1 and 5 minutes weighting 3230g.  Placenta delivered with gentle cord traction; noted to be intact with 3 vessel cord. No lacteration. Good hemostasis noted. Fundus firm and lochia minimal at the end of the case. QBL 50.            Hospital Course:   The patient's hospital course was unremarkable.  On discharge, her pain was well controlled. Vaginal bleeding is similar to peak menstrual flow.  Voiding without difficulty.  Ambulating well and tolerating a normal diet.  No fever.  Breastfeeding well.  Infant is stable.  No bowel movement yet, passing flatus. She was discharged on post-partum day #2.    Post-partum hemoglobin: 11.9          Discharge Instructions and Follow-Up:     Discharge diet: Regular   Discharge activity: Pelvic rest for 6 weeks including no sexual intercourse, tampons, or douching.    Discharge follow-up: Follow up with your primary OB for a routine postpartum visit in 6 weeks           Discharge Disposition:     Discharged to home in stable condition      Lake Yuen MD, MPH  Ob/Gyn Resident, PGY-1  21 10:49 AM    I saw and evaluated this patient prior to discharge. I discussed the patient with the resident and agree with plan of care as documented in the resident note.   I personally reviewed vital signs, medications, labs.   I personally spent 10 minutes on discharge  activities.  Nuzhat Conte MD

## 2021-08-19 NOTE — L&D DELIVERY NOTE
OB Vaginal Delivery Note    Berto Groves MRN# [id number]   Age: 41 year old YOB: 1980       GA: 37w6d  GP:   Labor Complications: None   Delivery QBL: 50 mL  Delivery Type: Vaginal, Spontaneous   ROM to Delivery Time: (Delivered) Hours: 1 Minutes: 30  [place] Weight:     1 Minute 5 Minute 10 Minute   Apgar Totals: 6   7        LENKA RAMIREZ;PROVIDER NOT IN SYSTEM     Delivery Details:  Berto Groves, a 41 year old  female delivered a viable infant with apgars of 6  and 7 . Patient was fully dilated and pushing after 8  hours 5  minutes in active labor. Delivery was via vaginal, spontaneous  to a sterile field under no anesthesia. Infant delivered in RADHA position. Anterior and posterior shoulders delivered without difficulty. The cord was clamped, cut twice. Cord blood was obtained in routine fashion with the following disposition:  .      Cord complications: none   Placenta delivered at 2021  2:51 PM . Placental disposition was Hospital disposal . Fundal massage performed and fundus found to be firm.     Episiotomy: none    Perineum, vagina, cervix were inspected, and the following lacerations were noted:   Perineal lacerations: none     No lacerations noted    Excellent hemostasis was noted. Needle count correct. Infant and patient in delivery room in good and stable condition.        Nadira Groves, Male-Berto Castellanos [[id number]]    Labor Event Times    Labor onset date: 21 Onset time:  6:00 AM   Dilation complete date: 21 Complete time:  2:05 PM   Start pushing date/time: 2021 1405      Labor Length    1st Stage (hrs): 8 (min): 5   2nd Stage (hrs): 0 (min): 43   3rd Stage (hrs): 0 (min): 3      Labor Events     labor?: No   steroids: None  Labor Type: Spontaneous  Predominate monitoring during 1st stage: continuous electronic fetal monitoring     Antibiotics received during labor?: Yes  Reason for Antibiotics:  GBS  Antibiotics received for GBS: Penicillin  Antibiotics Given (GBS): Greater than 4 hours prior to delivery     Rupture date/time: 21 1318   Rupture type: Artificial Rupture of Membranes, Spontaneous rupture of membranes occuring during spontaneous labor or augmentation  Fluid color: Clear, Pink     Augmentation: Oxytocin, AROM  Indications for augmentation: Ineffective Contraction Pattern  1:1 continuous labor support provided by?: none Labor partogram used?: no      Delivery/Placenta Date and Time    Delivery Date: 21 Delivery Time:  2:48 PM   Placenta Date/Time: 2021  2:51 PM  Oxytocin given at the time of delivery: after delivery of placenta  Delivering clinician: Catalina Lopez MD   Other personnel present at delivery:  Provider Role   Nuzhat Guallpa RN Registered Nurse   System, Provider Not In          Vaginal Counts     Initial count performed by 2 team members:  Two Team Members   Bhaskar Guallpa RN       [place] Suture Needles Sponges (RETIRED) Instruments   Initial counts 2 0 5    Added to count 0 0 0    Relief counts 0 0 5    Final counts 2 0 5          Placed during labor Accounted for at the end of labor   FSE NA NA   IUPC NA NA   Cervadil NA NA              Final count performed by 2 team members:  Two Team Members   Bhaskar Guallpa RN         Apgars    Living status: Living   1 Minute 5 Minute 10 Minute 15 Minute 20 Minute   Skin color: 0  1       Heart rate: 2  2       Reflex irritability: 1  1       Muscle tone: 1  1       Respiratory effort: 2  2       Total: 6  7       Apgars assigned by: FREDDY CANAS MD     Cord    Cord Complications: None               Stem cell collection?: No       [place] Resuscitation    Methods: NCPAP, Oximetry   Care at Delivery: NNP Delivery Note    Asked by Dr. Lopez to attend the delivery of this term, male infant with a gestational age of 37 6/7 weeks secondary to AV canal, fetal hydrops with trace  pleural effusion and pericardial effusion.      Infant delivered at 1448 hours on 2021. Infant had spontaneous respirations at birth. Due to low tone and central cyanosis, he was immediately taken to the warmer, dried, stimulated, and suctioned at birth. HR >130. CPAP +5 started due to initial sats 40%, briefly needed 60% FiO2 but able to wean to 21%. Removed CPAP with sats in the 80s. Infant then had desats to 69 and was placed back on CPAP +5 21% with improvement of sats to the 80s. Apgars were 6 at one minute and 7 at five minutes of age. Gross PE is WNL except for hypotonia. Infant was shown to mother and father and will be transferred to the NICU for further care.    Delivery was supervised by KILLIAN Escudero.    Brittni Cleveland MD, DPhil  - Medicine Fellow  [facility]         Labor Events and Shoulder Dystocia    Fetal Tracing Prior to Delivery: Category 2  Shoulder dystocia present?: Neg     Delivery (Maternal) (Provider to Complete) (111960)    Episiotomy: None  Perineal lacerations: None    Repair suture: None  Genital tract inspection done: Pos     Blood Loss  Mother: Erin Mcgregor #[id number]   Start of Mother's Information    Delivery Blood Loss  21 0600 - 21 1704    Delivery QBL (mL) Hospital Encounter 50 mL    Total  50 mL         End of Mother's Information  Mother: Erin Mcgregor #[id number]          Delivery - Provider to Complete ([id number])    Delivering clinician: Catalina Lopez MD  Attempted Delivery Types (Choose all that apply): Spontaneous Vaginal Delivery  Delivery Type (Choose the 1 that will go to the Birth History): Vaginal, Spontaneous                   Other personnel:  Provider Role   Nuzhat Guallpa RN Registered Nurse   System, Provider Not In                 Placenta    Date/Time: 2021  2:51 PM  Removal: Spontaneous  Disposition: Hospital disposal                  Catalina Lopez MD

## 2021-08-19 NOTE — PLAN OF CARE
Pt in stable condition through her recovery period.  VSS, postpartum checks wdl.  Able to ambulate to the bathroom and void.  Kellie cares done.  Pumped with small drops out, hand expression demonstrated.  Complaining of uterine cramping after pumping, tylenol given.  Tolerating regular diet.  Plan to continue with routine postpartum cares.

## 2021-08-19 NOTE — PLAN OF CARE
Data: Berto Groves transferred to [id number] via wheelchair at 1650.  Stopped in NICU for patient to see baby, arrived to [facility] at 1710.  Action: Receiving unit notified of transfer: Yes. Patient and family notified of room change. Report given to Margaret BEEBE at 1715. Belongings sent to receiving unit. Accompanied by Registered Nurse. Oriented patient to surroundings. Call light within reach.  Response: Patient tolerated transfer and is stable.

## 2021-08-19 NOTE — H&P
History and Physical     Berto Groves MRN# [id number]   YOB: 1980 Age: 41 year old      Date of Admission: 2021    Primary care provider: Delaney Avila      Assessment and Plan:     Berto Groves is a 41 year old  at 37w6d by LMP c/w 9w1d US - presenting in spontaneous labor. Pregnancy is notable for suspected Trisomy 21 (declines definitive genetic testing), CHD (fetal unbalanced left dominant AV canal defect with large primum ASD and inlet VSD and trivial common AV valve insufficiency, pleural effusion - right side, pericardial effusion), fetal hydrops, absent nasal bone, GBS+, and advanced maternal age.    Spontaneous Labor  - SVE consistent with early labor, however given clinical presentation, GBS positive status, and multiparity to begin PCN at this time with admission for suspected impending delivery.   - Pain: Desires IV Fentanyl, no Epidural   - Cephalic on BSUS, EFW 6.5lb   - GBS positive - to start PCN at this time.   - NICU aware of CHD as listed below - consult pending.     Fetal Congenital Anomalies   - C/w unbalanced AV canal defect, pleural effusion (right side), pericardial effusion, and absent nasal bone  - Echo performed : Unbalanced left dominant atrioventricular canal defect with large primum atrial and inlet ventricular septal defects. Trivial common atrioventricular valve insufficiency. The right ventricle is at least moderately hypoplastic; normal systolic function. Normal left ventricular size and systolic function.  Unobstructed right and left ventricular outflow tracts; the pulmonary and aortic valve appears mildly thickened without flow turbulence; normal ductus arteriosus flow. The aortic arch is unobstructed. Small pericardial and  pleural effusions.  - s/p NICU, Peds cards outpatient.   - NICU to be at delivery/admission -  ECHO shortly after birth     Routine Prenatal Care   - Prenatal labs:  Rh: +   antibody: neg               HepB/HIV/RPR: nonreactive               Rubella: immune                 GCT: pass               UC: no growth  - Pap smear: NIL and HPV neg 2021  - Immunizations:  s/p TDap; declines Covid vaccine while pregnant  - Genetic screening: declined. Does desire inpatient genetic consult, but declined cord blood testing after delivery.  - GBS positive     Fetal Well-Being   Cat I tracing, reactive; cephalic by BSUS; EFW 6.5lb  - Continuous Fetal Monitoring  - NICU for delivery   - Intrauterine resuscitative measures prn    Patient discussed with Dr. Lopez, who agrees with the current care plan.          Appreciate note by Dr. Perez. Patient has been seen and examined by me separate from the resident, agree with above note.     Catalina Lopez MD  12:57 PM     HPI:     Berto Groves is a 41 year old  at 37w6d by LMP c/w 9w1d US who presents today with painful, regular contractions. Pregnancy complicated by below concerns.     The patient states that yesterday she started to experience painful, regular contractions. Has history of  x4 (deliveries at home in the [place], uncomplicated at term per patient) with precipitous labor. Given concern for imminent delivery, patient presented for further care.     Currently the patient reports good fetal movement. Denies LOF. [place]-tinged vaginal discharge. She denies fever, chills, SOB, chest pain, palpitations, N/V, LE swelling/tenderness.  No concerns for headache, vision changes, RUQ or epigastric pain. All other ROS negative.     Pregnancy notable for:   - CHD (unbalanced AV canal defect, pleural effusion, pericardia effusion)  - Absent fetal nasal bone - concern for Trisomy 21   - AMA status     OB History:    OB History    Para Term  AB Living   5 4 0 0 0 0   SAB TAB Ectopic Multiple Live Births   0 0 0 0 4      # Outcome Date GA Lbr Michael/2nd Weight Sex Delivery Anes PTL Lv   5 Current           "  4 Para            3 Para            2 Para            1 Para                 Prenatal Lab Results:  Lab Results   Component Value Date    ABO O 2021    RH Pos 2021    AS Neg 2021    HEPBANG Nonreactive 2021    HGB 12.6 2021       GBS Status:   No results found for: GBS             Past Medical History:     Past Medical History:   Diagnosis Date    Migraine without aura and without status migrainosus, not intractable              Past Surgical History:     Past Surgical History:   Procedure Laterality Date    LEG SURGERY Left 2014    ligaments             Social History:     Social History     Tobacco Use    Smoking status: Never Smoker    Smokeless tobacco: Never Used   Substance Use Topics    Alcohol use: Not Currently     Comment: occasional             Family History:     Family History   Problem Relation Age of Onset    No Known Problems Mother     No Known Problems Father     No Known Problems Sister     No Known Problems Brother     No Known Problems Brother     Breast Cancer No family hx of     Colon Cancer No family hx of     Sickle Cell Anemia No family hx of     Sickle Cell Trait No family hx of              Immunizations:     Immunization History   Administered Date(s) Administered    TDAP Vaccine (Adacel) 2019    Tdap (Adacel,Boostrix) 2019, 2021            Allergies:   No Known Allergies          Medications:     Medications Prior to Admission   Medication Sig Dispense Refill Last Dose    Prenatal Vit-Fe Fumarate-FA (PRENATAL VITAMIN PLUS LOW IRON) 27-1 MG TABS Take 1 tablet by mouth daily 100 tablet 3     pyridOXINE (VITAMIN B6) 25 MG tablet Take 1 tablet (25 mg) by mouth 3 times daily as needed (nausea) (Patient not taking: Reported on 2021) 90 tablet 1              Review of Systems & Physical Exam:     The Review of Systems is negative other than noted in the HPI      Ht 1.6 m (5' 3\")   Wt 65.3 kg (144 lb)   LMP 2020 " (Exact Date)   BMI 25.51 kg/m    Gen: Well appearing, no distress  CV: RRR, nl S1/S2, no murmurs/clicks/gallops  Lungs: CTAB, non-labored breathing  Abd: Gravid, non-tender, non-distended  Ext: Trace peripheral extremity edema    Cervix: 1-2/20-30/high (0800 per RN)   Membranes: Intact   Presentation: Cephalic by BSUS.  lbEstimated Fetal Weight: 7lb    FHT:  Monitoring External  FHT: Baseline 145bpm bpm; mod variability; pos accels present; no decelerations  TOCO 2-3 contractions in 10 minutes         Data:     Recent Results (from the past 1008 hour(s))   Group B strep PCR    Collection Time: 08/10/21 12:52 PM    Specimen: Rectovaginal; Swab   Result Value Ref Range    Group B Strep PCR Positive (A) Negative    Penicillin, amoxicillin, or cephalosporin allergy? No    Asymptomatic COVID-19 Virus (Coronavirus) by PCR Nasopharyngeal    Collection Time: 08/19/21  7:52 AM    Specimen: Nasopharyngeal; Swab   Result Value Ref Range    SARS CoV2 PCR Negative Negative   Adult Type and Screen    Collection Time: 08/19/21  8:23 AM   Result Value Ref Range    ABO/RH(D) O POS     Antibody Screen Negative Negative    SPECIMEN EXPIRATION DATE [id number]    CBC with platelets and differential    Collection Time: 08/19/21  8:23 AM   Result Value Ref Range    WBC Count 10.2 4.0 - 11.0 10e3/uL    RBC Count 3.90 3.80 - 5.20 10e6/uL    Hemoglobin 12.6 11.7 - 15.7 g/dL    Hematocrit 37.5 35.0 - 47.0 %    MCV 96 78 - 100 fL    MCH 32.3 26.5 - 33.0 pg    MCHC 33.6 31.5 - 36.5 g/dL    RDW 13.4 10.0 - 15.0 %    Platelet Count 231 150 - 450 10e3/uL    % Neutrophils 79 %    % Lymphocytes 16 %    % Monocytes 4 %    % Eosinophils 0 %    % Basophils 0 %    % Immature Granulocytes 1 %    NRBCs per 100 WBC 0 <1 /100    Absolute Neutrophils 8.1 1.6 - 8.3 10e3/uL    Absolute Lymphocytes 1.6 0.8 - 5.3 10e3/uL    Absolute Monocytes 0.4 0.0 - 1.3 10e3/uL    Absolute Eosinophils 0.0 0.0 - 0.7 10e3/uL    Absolute Basophils 0.0 0.0 - 0.2 10e3/uL     Absolute Immature Granulocytes 0.1 (H) <=0.0 10e3/uL    Absolute NRBCs 0.0 10e3/uL     Samantha Perez MD  OB/GYN PGY-2  08/19/21 11:19 AM

## 2021-08-20 LAB
HGB BLD-MCNC: 11.9 G/DL (ref 11.7–15.7)
HOLD SPECIMEN: NORMAL

## 2021-08-20 PROCEDURE — 250N000013 HC RX MED GY IP 250 OP 250 PS 637: Performed by: OBSTETRICS & GYNECOLOGY

## 2021-08-20 PROCEDURE — 85018 HEMOGLOBIN: CPT | Performed by: OBSTETRICS & GYNECOLOGY

## 2021-08-20 PROCEDURE — 120N000002 HC R&B MED SURG/OB UMMC

## 2021-08-20 PROCEDURE — 36415 COLL VENOUS BLD VENIPUNCTURE: CPT | Performed by: OBSTETRICS & GYNECOLOGY

## 2021-08-20 RX ADMIN — IBUPROFEN 800 MG: 800 TABLET, FILM COATED ORAL at 06:49

## 2021-08-20 RX ADMIN — ACETAMINOPHEN 650 MG: 325 TABLET, FILM COATED ORAL at 09:41

## 2021-08-20 RX ADMIN — IBUPROFEN 800 MG: 800 TABLET, FILM COATED ORAL at 16:54

## 2021-08-20 RX ADMIN — DOCUSATE SODIUM 100 MG: 100 CAPSULE, LIQUID FILLED ORAL at 09:12

## 2021-08-20 RX ADMIN — ACETAMINOPHEN 650 MG: 325 TABLET, FILM COATED ORAL at 22:20

## 2021-08-20 NOTE — PLAN OF CARE
Data: Vital signs within normal limits. Postpartum checks within normal limits - see flow record. Patient eating and drinking normally. Patient able to empty bladder independently and is up ambulating. No apparent signs of infection. Patient performing self cares and is pumping for baby in NICU.  Action: Patient reports 8/10 pain when pumping,  Ibuprofen given. Support person, , present.

## 2021-08-20 NOTE — LACTATION NOTE
"This note was copied from a baby's chart.  D:  I met with Erin and Duglas at Daniel's bedside for lactation introduction and a feeding demonstration. Daniel is Erin's 5th baby; she  her other 4 children for one year each; pumping is a new experience for her. She is normally in good health (per chart review has migraines), takes no medications, and has no history of breast/chest surgery or trauma.  She has already started to pump. She positioned Daniel initially in a cradle hold, but it appeared he needed additional support with fairly low tone; we switched to cross cradle hold. We discussed supportive hold, positioning, latch, breastfeeding patterns, breast support and compressions, use/rationale of the nipple shield, skin to skin benefits, and timing of pumpings around breastfeedings.  I fitted her with a 20mm shield and instructed her in its use; she did not use nipple shield for this feeding, as he nuzzled/explored at breast before fatiguing.   I:  I gave her a folder of introductory materials and went over pumping guidelines.  I reviewed physiology of colostrum and milk production, pumping guidelines, and I gave her a log and encouraged her to use it.   I explained how to access the videos \"Hand Expression\" and \"Maximizing Milk Production\"; as well as other helpful books and websites.   We discussed hands-on pumping techniques and usefulness of a hands-free pumping bra.  We discussed skin to skin holding and how to reach your breastfeeding goals.  We talked about medications during breastfeeding.  She verbalized understanding via teachback.  I advised her to call her insurance company about pump coverage.    A:  Mom has information she needs to initiate her supply. Sleepy feeding demonstration.   P: Will continue to provide lactation support.    ROBER Barlow, RN, IBCLC            "

## 2021-08-20 NOTE — PLAN OF CARE
Patient arrived to [facility] unit via wheelchair at 1715,with belongings, accompanied by spouse/ significant other, with infant in in the NICU. Received report from Fallon SELLERS RN and checked bands. Unit and room orientation started. Call light given; no concerns present at this time. Continue with plan of care.

## 2021-08-20 NOTE — PLAN OF CARE
Pt is stable. Vital sign stable and FF at U/U with small lochia. Pt was down in NICU early part of the shift and was seen later on by OB doctor/ ped cardiology from NICU. Pt is pumping. Pt was down for a long meeting and also spending time with baby. Complains of cramping. Medicated pt with Tylenol and Ibuprofen for pain control. Encouraged to pump every 2-3 hours. Continue care.

## 2021-08-20 NOTE — PROGRESS NOTES
Post Partum Progress Note  PPD#1    Subjective:  She is resting comfortably in bed this morning. She complains of mild cramping, but overall pain is improving and well controlled on current medication regimen. She is tolerating PO intake. Lochia present and similar to peak menses.  She is voiding without difficulty. She is ambulating without dizziness or difficulty.  She denies headache, changes in vision, nausea/vomiting, chest pain, shortness of breath, RUQ pain, or worsening edema.  Plans to breastfeed but has difficulty at this time with pumping    Objective:  Vitals:    21 1715 21 1745 21 2300 21 0632   BP: 113/69  101/60 102/64   Pulse: 75  79 65   Resp: 14   16   Temp: 100.1  F (37.8  C) 98.9  F (37.2  C) 98.7  F (37.1  C) 99.4  F (37.4  C)   TempSrc: Oral Oral Oral Oral   Weight:       Height:           General: NAD. A&Ox3.  CV: Regular rate, well perfused.   Pulm: Normal respiratory effort.  Abd: Soft, non-tender, non-distended. Fundus is firm and below the umbilicus.    Ext: no lower extremity edema bilaterally. No calf tenderness.    Assessment/Plan:  Berto Groves is a 41 year old  female who is PPD#1 s/p .    - Encourage routine post-operative goals including ambulation and incentive spirometry  - PNC: Rh pos. Rubella immune. No intervention indicated.  - Pain: controlled on oral medications  - Heme: Hgb 12.6>>  Hemoglobin   Date Value Ref Range Status   2021 11.9 11.7 - 15.7 g/dL Final   2021 11.7 11.7 - 15.7 g/dL Final   - GI: continue anti-emetics and stool softeners as needed.  - : Voiding spontaneously.  - Infant: Stable in NICU, SW today  - Feeding: Plans on breastfeeding, lactation today for pumping help  - BC: Plans on IUD at 6wk    Discharge to home on PPD#2    Isabel Weaver MD  ObGyn Resident, PGY2   2021 7:56 AM    [facility] staff:  Appreciate note by Dr. Weaver.  I have seen and examined the  patient without the resident with the help of a Vietnamese . I have reviewed, edited, and agree with the note.        Callie Gramajo MD, FACOG

## 2021-08-21 VITALS
BODY MASS INDEX: 25.52 KG/M2 | OXYGEN SATURATION: 99 % | DIASTOLIC BLOOD PRESSURE: 51 MMHG | SYSTOLIC BLOOD PRESSURE: 119 MMHG | WEIGHT: 144 LBS | HEIGHT: 63 IN | HEART RATE: 76 BPM | TEMPERATURE: 98.1 F | RESPIRATION RATE: 16 BRPM

## 2021-08-21 PROCEDURE — 250N000013 HC RX MED GY IP 250 OP 250 PS 637: Performed by: OBSTETRICS & GYNECOLOGY

## 2021-08-21 RX ADMIN — ACETAMINOPHEN 650 MG: 325 TABLET, FILM COATED ORAL at 04:04

## 2021-08-21 RX ADMIN — DOCUSATE SODIUM 100 MG: 100 CAPSULE, LIQUID FILLED ORAL at 08:00

## 2021-08-21 RX ADMIN — IBUPROFEN 800 MG: 800 TABLET, FILM COATED ORAL at 05:02

## 2021-08-21 RX ADMIN — ACETAMINOPHEN 650 MG: 325 TABLET, FILM COATED ORAL at 08:00

## 2021-08-21 NOTE — PLAN OF CARE
VSS, postpartum assessment WDL, scant amount of lochia.  Denies vision changes, lightheadedness, H/A.  Ambulating independently- steady on feet.  Pumping Q 3-4 hours overnight, producing approx. 10-15 mL colostrum.  Uterine cramping managed with Ibuprofen, Tylenol and warm packs. Went to NICU 1x overnight to breastfeed infant.   at bedside and very supportive.  Pt is Macedonian speaking, however does undertstant some English. Continue with POC.

## 2021-08-21 NOTE — CONSULTS
[facility]  MATERNAL CHILD HEALTH   INITIAL NICU PSYCHOSOCIAL ASSESSMENT     DATA:     Presenting Information: MomGabe Khan, is a  who delivered an infant boy, Daniel on 2021 at 37w6dd gestation in the setting of . Baby was admitted to the NICU for further evaluation, monitoring and management of suspected trisomy 21, unbalanced AV canal, and fetal hydrops..  SW was consulted to meet with this family per NICU admission of infant.     Living Situation: Parents are  and live together in [place], [place] with their four other children who are 18, 15, 10 and 8 years old.     Social Support: They moved here from the [place] in [facility] four years ago. Most of their family still lives there but they are in contact by phone. They have some friends from work, but they don't really know anyone else in their community for support.     Education/Employment: Duglas is a  for [employer]. He can take as much time as he wants, he just won't be paid. 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     Insurance: [employer] through work, was on MA before and wants to sign baby up for MA    Source of Financial Support: parental employment, WIC      Mental Health History: No    History of Postpartum Mood Disorders: No    Chemical Health History: No    Legal/Child Protection Involvement: No    INTERVENTION:       Chart review    Collaboration with team: ABIEL Melendez     Conducted Psychosocial Assessment    Introduction to Maternal Child Health SW role and scope of practice    Orientation to the NICU (parking, lodging, meals, visitation)    Validated emotions and provided supportive listening    Provided resources and referrals    Parking Pass    [facility] number for MA/MFIP/SNAP     Provided psychoeducation on  mood disorders and indicated that SW would continue to monitor mood and  support bridging to mental health resources as needed.    Provided SW contact info    ASSESSMENT:     Coping: Parents are attentive to each other's needs and are getting ready to discharge from [facility]. They are very open and asked good questions with a French  by phone. Dad speaks some English. Duglas was present and attentive and supportive.     They are able to articulate their baby's issues with his heart and report they need to wait 6-7 weeks for heart surgery. They were grateful to talk to the cardiologist yesterday to get more information. They are unsure if he will be in the NICU during this time or discharged home to wait for surgery. They prefer to stay with baby in a private room if possible. They will find out soon from their medical team if this is possible and SW will also advocate for this. If not, they will commute back and forth and SW confirmed they can visit 24 hours a day. SW also discussed Care Space and parents were excited by this possibility.     They are aware of the possible trisomy 21 diagnosis. They are nervous only because there were no complications with any of her other children so this is just new. SW explored some resources with parents, but they want to get the official diagnosis, which should be soon. They agreed to genetic testing.     They are unsure if the breast pump is covered through her insurance. We called together with a French  but the people who know about breast pumps from this insurance, only work M-F 7am - 4pm. The RN will give her a pump for now but she will need to call on Monday to see if it is covered. She should not open it until she knows it is covered. If it is not, she has 10 day to return the pump, but it cannot be opened. Her insurance should be able to help her find something that is covered. SW encouraged the family to reach out to her on Monday if they need help calling insurance with an  again.     Assessment of parental  risk for PMAD: Higher than average risk, considering medically complexity     Risk Factors: limited social support, medical complexity, hospitalization during a global pandemic     Resiliency Factors & Strengths: parental employment, stable housing, ability to advocate and ask questions    PLAN:     SW will continue to follow for supportive intervention.    SATISH Walker, [facility]  Maternal and Child Health   Office: [phone]  [email or web address]

## 2021-08-21 NOTE — PROGRESS NOTES
Post Partum Progress Note  PPD#2    Subjective:  She is resting comfortably in bed this morning. She complains of mild cramping, but pain is improving and well controlled on current medication regimen. She is tolerating PO intake. Lochia present and similar to peak menses. She is voiding without difficulty. She is ambulating without dizziness or difficulty. She denies headache, changes in vision, nausea/vomiting, chest pain, shortness of breath, RUQ pain, or worsening edema. Plans to breastfeed but has difficulty at this time with pumping    Objective:  Vitals:    21 0913 21 1654 21 2030 21 0009   BP: 98/62 105/67 110/68 106/78   Pulse: 61 75 78 67   Resp: 16 16 18 18   Temp: 98.2  F (36.8  C) 99.4  F (37.4  C) 98.5  F (36.9  C) 98.6  F (37  C)   TempSrc: Oral Oral Oral Oral   SpO2:   99%    Weight:       Height:           General: NAD. A&Ox3.  CV: Regular rate, well perfused.  Pulm: Normal respiratory effort.  Abd: Soft, non-tender, non-distended. Fundus is firm and at the level of the umbilicus.    Ext: no lower extremity edema bilaterally. No calf tenderness.    Assessment/Plan:  Berto Groves is a 41 year old  female who is PPD#2 s/p .    - Encourage routine post-operative goals including ambulation and incentive spirometry  - PNC: Rh pos. Rubella immune. No intervention indicated.  - Pain: controlled on oral medications  - Heme: Hgb 12.6>> 11.9   - GI: continue anti-emetics and stool softeners as needed.  - : Voiding spontaneously.  - Infant: Stable in NICU, SW today  - Feeding: Plans on breastfeeding, lactation today for pumping help  - BC: Plans on IUD at 6wk    Discharge to home on PPD#2-3    Lake Yuen MD, MPH  Ob/Gyn Resident, PGY-1  21 7:20 AM    Appreciate Dr. Yuen's note above, patient also seen and examined by me. I agree with the note above.  Patient is meeting discharge goals and will be able to discharge home today as they await  boarding room in NICU.   Nuzhat Conte MD

## 2021-08-21 NOTE — PLAN OF CARE
Data: Vital signs within normal limits. Postpartum checks within normal limits - see flow record. Patient eating and drinking normally. Patient is back from the NICU and is resting in her room with a spiritual video playing on her phone. Patient able to empty bladder independently and is up ambulating. No apparent signs of infection. Perineum is healing well. Patient performing self cares and is able to visit infant in the NICU.  Action: Patient was not medicated as of yet during the shift for pain. See MAR.  Patient education done about pumping. See flow record.  Response: Positive attachment behaviors observed with infant. Support persons was present.   Plan: Anticipate discharge.

## 2021-08-21 NOTE — PLAN OF CARE
Data: Vital signs stable and postpartum checks within normal limits. Pt is up voiding, showered this morning and ambulating down to NICU to breastfeed baby. Pt is pumping and getting up to about 30 ml. Complains of cramping discomfort.   Action: Medicated pt with Tylenol for pain control. Review of care plan, teaching, and discharge instructions done with pt using the Faroese  on the phone. pt verification with signature obtained. Pt was given discharge medications, gift for baby and copies of the discharge papers.   Response: Pt states understanding and all questions about her care addressed. Pt discharged to boarding with baby.

## 2021-08-26 LAB
PATH REPORT.COMMENTS IMP SPEC: NORMAL
PATH REPORT.COMMENTS IMP SPEC: NORMAL
PATH REPORT.FINAL DX SPEC: NORMAL
PATH REPORT.GROSS SPEC: NORMAL
PATH REPORT.MICROSCOPIC SPEC OTHER STN: NORMAL
PATH REPORT.RELEVANT HX SPEC: NORMAL
PHOTO IMAGE: NORMAL

## 2021-08-26 PROCEDURE — 88307 TISSUE EXAM BY PATHOLOGIST: CPT | Mod: 26 | Performed by: PATHOLOGY

## 2021-10-06 ENCOUNTER — PRENATAL OFFICE VISIT (OUTPATIENT)
Dept: OBGYN | Facility: CLINIC | Age: 41
End: 2021-10-06
Payer: COMMERCIAL

## 2021-10-06 VITALS
BODY MASS INDEX: 24.06 KG/M2 | OXYGEN SATURATION: 100 % | DIASTOLIC BLOOD PRESSURE: 77 MMHG | SYSTOLIC BLOOD PRESSURE: 116 MMHG | WEIGHT: 135.8 LBS | HEART RATE: 81 BPM

## 2021-10-06 NOTE — PROGRESS NOTES
POSTPARTUM VISIT:    Delivery Information:    Date:  08/19/2021  Route:  Vaginal delivery   Sex:  Male     Weight:  3230 g      Apgars:  6/7  Reviewed pregnancy and birth.  Doing well.  No significant symptoms.  Infant doing fine.  Breast feeding:  Yes   Bottle:  Yes   Formula:  Yes     Exam:  /77 (BP Location: Right arm, Cuff Size: Adult Regular)   Pulse 81   Wt 61.6 kg (135 lb 12.8 oz)   LMP 11/27/2020 (Exact Date)   SpO2 100%   Breastfeeding Yes   BMI 24.06 kg/m    PHQ-9 = No Value exists for the : HP#PHQ9  General:  WNWD female, NAD  Alert  Oriented x 3  Gait:  Normal  Skin:  Normal skin turgor  HEENT:  NC/AT, EOMI  Abdomen:  Non-tender, non-distended.  Pelvic exam:  Not performed.     Reviewed contraception plans.  We reviewed the options available, the side effects, risks, benefits and instructions on proper use.  She is contemplating the Mirena IUD and the Nexplanon.  Information reviewed and the goals, limitations and side effects.  She declines the ParaGard IUD due to the cramps and bleeding profile when not on hormones.  She had unprotected sex last week.  She will need to be rescheduled for the procedure and this has been scheduled for her.  She is to use protection, or to abstain until placement.   Continue general medical care.

## 2021-10-10 ENCOUNTER — HEALTH MAINTENANCE LETTER (OUTPATIENT)
Age: 41
End: 2021-10-10

## 2021-10-20 ENCOUNTER — MEDICAL CORRESPONDENCE (OUTPATIENT)
Dept: HEALTH INFORMATION MANAGEMENT | Facility: CLINIC | Age: 41
End: 2021-10-20

## 2021-10-20 ENCOUNTER — IMMUNIZATION (OUTPATIENT)
Dept: NURSING | Facility: CLINIC | Age: 41
End: 2021-10-20
Payer: COMMERCIAL

## 2021-10-20 DIAGNOSIS — Z23 HIGH PRIORITY FOR 2019-NCOV VACCINE: Primary | ICD-10-CM

## 2021-10-20 PROCEDURE — 0001A COVID-19,PF,PFIZER (12+ YRS): CPT

## 2021-10-20 PROCEDURE — 99207 PR NO CHARGE LOS: CPT

## 2021-10-20 PROCEDURE — 91300 COVID-19,PF,PFIZER (12+ YRS): CPT

## 2021-10-27 ENCOUNTER — OFFICE VISIT (OUTPATIENT)
Dept: OBGYN | Facility: CLINIC | Age: 41
End: 2021-10-27
Payer: COMMERCIAL

## 2021-10-27 VITALS
OXYGEN SATURATION: 99 % | SYSTOLIC BLOOD PRESSURE: 124 MMHG | HEART RATE: 71 BPM | BODY MASS INDEX: 24.62 KG/M2 | WEIGHT: 139 LBS | DIASTOLIC BLOOD PRESSURE: 82 MMHG

## 2021-10-27 DIAGNOSIS — Z30.017 INSERTION OF IMPLANTABLE SUBDERMAL CONTRACEPTIVE: ICD-10-CM

## 2021-10-27 DIAGNOSIS — Z30.017 NEXPLANON INSERTION: Primary | ICD-10-CM

## 2021-10-27 LAB — HCG UR QL: NEGATIVE

## 2021-10-27 PROCEDURE — 11981 INSERTION DRUG DLVR IMPLANT: CPT | Performed by: OBSTETRICS & GYNECOLOGY

## 2021-10-27 PROCEDURE — 81025 URINE PREGNANCY TEST: CPT | Performed by: OBSTETRICS & GYNECOLOGY

## 2021-10-27 NOTE — PROGRESS NOTES
Thai  used:     BertoAcecatherinesarah Frey is a 41 year old female  No LMP recorded.     I previously reviewed options regarding contraception, and again today reviewed the Nexplanon as a sub-dermal implant effectiveness for 3 years.     /82 (BP Location: Right arm, Cuff Size: Adult Regular)   Pulse 71   Wt 63 kg (139 lb)   SpO2 99%   Breastfeeding Yes   BMI 24.62 kg/m       We reviewed the mechanism of actions, goals and limitations of the use of the medication, as well as the contraindications.  Bleeding patterns were also reviewed with her. She voiced her understanding.  The patient and I reviewed Nexplanon removal, and should she choose to have the product removed, she needs to have someone trained for the insertion and removal.  Informed consent obtained.    Patient was placed in a supine position. Left arm was flexed at the elbow and externally rotated. Insertion site was noted at 6 cm above the elbow crease at the inner side of the upper arm overlying the grove between the biceps and the triceps. A second vignesh was made 6 cm from the first to use as a guide. The area of the insertion site was prepped with Betadine. Lidocaine 1% was used along the planned insertion site. The Nexplanon was removed from its packaging.   The Nexplanon applicator was held just above the needle at the textured surface area and the transparent protection cap was removed from the needle. The implant could be seen by looking into the tip of the needle. The implant could be seen by looking into the tip of the needle.  The skin was stretched at the insertion site. The needle was inserted with beveled side up just underneath the skin. Tenting was undertaken while the insertion needle to its full length. The purple slider was unlocked. The slider was moved fully back until it stopped.  The applicator was removed from her arm.  After the insertion, the implant was palpated by both myself and the  patient. The betadine was removed from her arm. Benzoin and Steri Strips were applied.  Telfa was applied to site, along with pressure dressing and sterile gauze.  The patient was given aftercare instructions, her user card with the lot number. She tolerated the procedure well.  No apparent complications.      Lot#:[id number]  Expiration date:07/01/2023

## 2021-11-23 ENCOUNTER — ALLIED HEALTH/NURSE VISIT (OUTPATIENT)
Dept: FAMILY MEDICINE | Facility: CLINIC | Age: 41
End: 2021-11-23
Payer: COMMERCIAL

## 2021-11-23 DIAGNOSIS — Z53.9 ERRONEOUS ENCOUNTER--DISREGARD: Primary | ICD-10-CM

## 2022-01-12 ENCOUNTER — ALLIED HEALTH/NURSE VISIT (OUTPATIENT)
Dept: FAMILY MEDICINE | Facility: CLINIC | Age: 42
End: 2022-01-12
Payer: COMMERCIAL

## 2022-01-12 DIAGNOSIS — Z23 HIGH PRIORITY FOR 2019-NCOV VACCINE: Primary | ICD-10-CM

## 2022-01-12 PROCEDURE — 99207 PR NO CHARGE NURSE ONLY: CPT

## 2022-01-12 PROCEDURE — 0002A COVID-19,PF,PFIZER (12+ YRS): CPT

## 2022-01-12 PROCEDURE — 91300 COVID-19,PF,PFIZER (12+ YRS): CPT

## 2022-09-18 ENCOUNTER — HEALTH MAINTENANCE LETTER (OUTPATIENT)
Age: 42
End: 2022-09-18

## 2022-11-09 ENCOUNTER — OFFICE VISIT (OUTPATIENT)
Dept: OBGYN | Facility: CLINIC | Age: 42
End: 2022-11-09
Payer: COMMERCIAL

## 2022-11-09 VITALS
WEIGHT: 130.4 LBS | OXYGEN SATURATION: 100 % | SYSTOLIC BLOOD PRESSURE: 138 MMHG | HEART RATE: 78 BPM | BODY MASS INDEX: 23.1 KG/M2 | DIASTOLIC BLOOD PRESSURE: 84 MMHG

## 2022-11-09 DIAGNOSIS — Z30.46 ENCOUNTER FOR NEXPLANON REMOVAL: ICD-10-CM

## 2022-11-09 DIAGNOSIS — Z97.5 BREAKTHROUGH BLEEDING ON NEXPLANON: Primary | ICD-10-CM

## 2022-11-09 DIAGNOSIS — N92.1 BREAKTHROUGH BLEEDING ON NEXPLANON: Primary | ICD-10-CM

## 2022-11-09 DIAGNOSIS — M54.50 LUMBAR BACK PAIN: ICD-10-CM

## 2022-11-09 PROCEDURE — 99212 OFFICE O/P EST SF 10 MIN: CPT | Mod: 25 | Performed by: OBSTETRICS & GYNECOLOGY

## 2022-11-09 PROCEDURE — 11982 REMOVE DRUG IMPLANT DEVICE: CPT | Performed by: OBSTETRICS & GYNECOLOGY

## 2022-11-21 NOTE — PROGRESS NOTES
French  used.     Erin is a 42 year old female, .  She presents today with her  regarding breakthrough bleeding while on Nexplanon.  She has had the Nexplanon for 1 year.   She has had back pain and bleeding while on the Nexplanon.  The pain is in the mid back.  She has taken Ibuprofen with a little relief.  She has had blood clots the size of erlin.  The Ibuprofen did not help with bleeding.   She has not tried any hormones for the breakthrough bleeding and she does not want to.    We discussed options for contraception.  She does not want hormones for contraception.      Past Medical History:   Diagnosis Date     Migraine without aura and without status migrainosus, not intractable        Past Surgical History:   Procedure Laterality Date     LEG SURGERY Left     ligaments        No Known Allergies    Current Outpatient Medications   Medication Sig Dispense Refill     etonogestrel (NEXPLANON) 68 MG IMPL 1 each (68 mg) by Subdermal route once       acetaminophen (TYLENOL) 325 MG tablet Take 2 tablets (650 mg) by mouth every 6 hours as needed for mild pain Start after Delivery. (Patient not taking: Reported on 10/6/2021) 100 tablet 0     Prenatal Vit-Fe Fumarate-FA (PRENATAL VITAMIN PLUS LOW IRON) 27-1 MG TABS Take 1 tablet by mouth daily (Patient not taking: Reported on 2022) 100 tablet 3       Social History     Socioeconomic History     Marital status:      Spouse name: Duglas Rodgers     Number of children: 4     Years of education: Not on file     Highest education level: Not on file   Occupational History     Not on file   Tobacco Use     Smoking status: Never     Smokeless tobacco: Never   Substance and Sexual Activity     Alcohol use: Not Currently     Comment: occasional     Drug use: Never     Sexual activity: Yes     Partners: Male     Birth control/protection: Implant     Comment: Nexplanon inserted 10/27/21 Dr. Boss   Other Topics Concern     Not on file    Social History Narrative     Not on file     Social Determinants of Health     Financial Resource Strain: Not on file   Food Insecurity: Not on file   Transportation Needs: Not on file   Physical Activity: Not on file   Stress: Not on file   Social Connections: Not on file   Intimate Partner Violence: Not on file   Housing Stability: Not on file       Family History   Problem Relation Age of Onset     No Known Problems Mother      No Known Problems Father      No Known Problems Sister      No Known Problems Brother      No Known Problems Brother      Breast Cancer No family hx of      Colon Cancer No family hx of      Sickle Cell Anemia No family hx of      Sickle Cell Trait No family hx of        Review of Systems:  10 point ROS of systems including Constitutional, Eyes, Respiratory, Cardiovascular, Gastroenterology, Genitourinary, Integumentary, Muscularskeletal, Psychiatric were all negative except for pertinent positives noted in my HPI and in the PMH.      Exam  /84 (BP Location: Right arm, Cuff Size: Adult Regular)   Pulse 78   Wt 59.1 kg (130 lb 6.4 oz)   LMP 10/21/2022 (Exact Date)   SpO2 100%   BMI 23.10 kg/m    General:  WNWD female, NAD  Alert  Oriented x 3  Gait:  Normal  Skin:  Normal skin turgor  HEENT:  NC/AT, EOMI  Abdomen:  Non-tender, non-distended.  Pelvic exam:  Not performed  Extremities:  No clubbing, no cyanosis and no edema.      Assessment  Breakthrough bleeding on Nexplanon  Back pain      Plan  She desires to have the Nexplanon removed.  She does not want hormones to attempt to manage the bleeding.  I don't feel the back pain is related with the Nexplanon.    The couple will discuss contraception options.   Questions seem to be answered.     Usama Boss MD      PROCEDURE  Alina Carrolle was counseled about removal. She voiced her understanding and informed consent was obtained.  Patient was placed in a supine position. Left arm was flexed at the elbow and externally  rotated. The implant was located by palpation and marked at the end closest to the elbow with a sterile marker. The implant was palpated by both myself and the patient.    The area was cleaned and antiseptic applied. I injected sufficient anesthetic, 1cc of 1% Lidocaine just underneath the end of the implant closest to the elbow.  I pressed down on the end of the implant closest to the axilla and made a 2 mm incision in the arm at the tip of the implant closest to the elbow. I gently pushed the implant toward the incision until the tip was visible and grasped the implant with sterile mosquito forceps and gently removed it.   The incision was closed with a butterfly closure and an adhesive bandage applied. A sterile gauze with pressure bandage was also applied.  Aseptic conditions were maintained throughout the procedure. The patient tolerated the procedure well.  No apparent complications.     Usama Boss MD

## 2023-01-29 ENCOUNTER — HEALTH MAINTENANCE LETTER (OUTPATIENT)
Age: 43
End: 2023-01-29

## 2023-02-16 ENCOUNTER — OFFICE VISIT (OUTPATIENT)
Dept: OPTOMETRY | Facility: CLINIC | Age: 43
End: 2023-02-16
Payer: COMMERCIAL

## 2023-02-16 DIAGNOSIS — H52.13 MYOPIA OF BOTH EYES: ICD-10-CM

## 2023-02-16 DIAGNOSIS — H52.4 PRESBYOPIA: ICD-10-CM

## 2023-02-16 DIAGNOSIS — Z01.01 ENCOUNTER FOR EXAMINATION OF EYES AND VISION WITH ABNORMAL FINDINGS: Primary | ICD-10-CM

## 2023-02-16 DIAGNOSIS — H11.001 PTERYGIUM OF EYE, RIGHT: ICD-10-CM

## 2023-02-16 DIAGNOSIS — H52.223 REGULAR ASTIGMATISM OF BOTH EYES: ICD-10-CM

## 2023-02-16 PROCEDURE — 92004 COMPRE OPH EXAM NEW PT 1/>: CPT | Performed by: OPTOMETRIST

## 2023-02-16 PROCEDURE — 92015 DETERMINE REFRACTIVE STATE: CPT | Performed by: OPTOMETRIST

## 2023-02-16 ASSESSMENT — CONF VISUAL FIELD
OD_INFERIOR_NASAL_RESTRICTION: 0
OS_NORMAL: 1
OD_SUPERIOR_NASAL_RESTRICTION: 0
OS_SUPERIOR_TEMPORAL_RESTRICTION: 0
OS_INFERIOR_TEMPORAL_RESTRICTION: 0
OS_INFERIOR_NASAL_RESTRICTION: 0
OD_NORMAL: 1
METHOD: COUNTING FINGERS
OS_SUPERIOR_NASAL_RESTRICTION: 0
OD_INFERIOR_TEMPORAL_RESTRICTION: 0
OD_SUPERIOR_TEMPORAL_RESTRICTION: 0

## 2023-02-16 ASSESSMENT — REFRACTION_MANIFEST
OD_SPHERE: -0.25
OS_AXIS: 161
METHOD_AUTOREFRACTION: 1
OS_AXIS: 167
OD_AXIS: 039
OD_CYLINDER: +0.50
OD_AXIS: 024
OS_SPHERE: -0.50
OS_CYLINDER: +1.25
OD_SPHERE: -0.25
OS_ADD: +1.25
OS_SPHERE: -0.75
OS_CYLINDER: +1.00
OD_ADD: +1.25
OD_CYLINDER: +0.50

## 2023-02-16 ASSESSMENT — VISUAL ACUITY
OD_SC: 20/20
OS_SC: 20/80
OS_SC: 20/40
OD_SC+: -1
METHOD: SNELLEN - LINEAR
OS_SC+: +2
OD_SC: 20/120+1

## 2023-02-16 ASSESSMENT — SLIT LAMP EXAM - LIDS
COMMENTS: NORMAL
COMMENTS: NORMAL

## 2023-02-16 ASSESSMENT — KERATOMETRY
OS_K1POWER_DIOPTERS: 42.00
OS_AXISANGLE2_DEGREES: 046
OD_K2POWER_DIOPTERS: 42.50
OD_AXISANGLE2_DEGREES: 164
OD_AXISANGLE_DEGREES: 074
OD_K1POWER_DIOPTERS: 41.25
OS_AXISANGLE_DEGREES: 136
OS_K2POWER_DIOPTERS: 42.50

## 2023-02-16 ASSESSMENT — TONOMETRY
OS_IOP_MMHG: 13
IOP_METHOD: APPLANATION
OD_IOP_MMHG: 14

## 2023-02-16 ASSESSMENT — CUP TO DISC RATIO
OS_RATIO: 0.5
OD_RATIO: 0.55

## 2023-02-16 ASSESSMENT — EXTERNAL EXAM - RIGHT EYE: OD_EXAM: NORMAL

## 2023-02-16 ASSESSMENT — EXTERNAL EXAM - LEFT EYE: OS_EXAM: NORMAL

## 2023-02-16 NOTE — PATIENT INSTRUCTIONS
Reading glasses prescription provided today.     Return in 1-2 years for a comprehensive eye exam, or sooner if needed.      The effects of the dilating drops last for 4- 6 hours.  You will be more sensitive to light and vision will be blurry up close.  Mydriatic sunglasses were given if needed.     Lake Sevilla, OD  [address]  [place]  [address] [phone]

## 2023-02-16 NOTE — PROGRESS NOTES
Chief Complaint   Patient presents with     Annual Eye Exam      Accompanied by , Duglas    Last Eye Exam: Never had one   Dilated Previously: No, side effects of dilation explained today     What are you currently using to see?  does not use glasses or contacts       Distance Vision Acuity: Satisfied with vision    Near Vision Acuity: Not satisfied - ongoing for ~6 months     Eye Comfort: watery occasionally in AM and while she is reading   Do you use eye drops? : No  Occupation or Hobbies: [employer]        Medical, surgical and family histories reviewed and updated 2/16/2023.       OBJECTIVE: See Ophthalmology exam    ASSESSMENT:    ICD-10-CM    1. Encounter for examination of eyes and vision with abnormal findings  Z01.01       2. Pterygium of eye, right  H11.001       3. Myopia of both eyes  H52.13       4. Regular astigmatism of both eyes  H52.223       5. Presbyopia  H52.4           PLAN:     Patient Instructions   Reading glasses prescription provided today.     Return in 1-2 years for a comprehensive eye exam, or sooner if needed.      The effects of the dilating drops last for 4- 6 hours.  You will be more sensitive to light and vision will be blurry up close.  Mydriatic sunglasses were given if needed.     Lake Sevilla, OD  [facility] [place]  [address] [place] [address]. ROBER Gordon  [address] [phone]

## 2023-02-16 NOTE — LETTER
2/16/2023         RE: Alina Frey  [address]  [place] [address]        Dear Colleague,    Thank you for referring your patient, Alina Frey, to the [facility]. Please see a copy of my visit note below.    Chief Complaint   Patient presents with     Annual Eye Exam      Accompanied by , Duglas    Last Eye Exam: Never had one   Dilated Previously: No, side effects of dilation explained today     What are you currently using to see?  does not use glasses or contacts       Distance Vision Acuity: Satisfied with vision    Near Vision Acuity: Not satisfied - ongoing for ~6 months     Eye Comfort: watery occasionally in AM and while she is reading   Do you use eye drops? : No  Occupation or Hobbies: [employer]        Medical, surgical and family histories reviewed and updated 2/16/2023.       OBJECTIVE: See Ophthalmology exam    ASSESSMENT:    ICD-10-CM    1. Encounter for examination of eyes and vision with abnormal findings  Z01.01       2. Pterygium of eye, right  H11.001       3. Myopia of both eyes  H52.13       4. Regular astigmatism of both eyes  H52.223       5. Presbyopia  H52.4           PLAN:     Patient Instructions   Reading glasses prescription provided today.     Return in 1-2 years for a comprehensive eye exam, or sooner if needed.      The effects of the dilating drops last for 4- 6 hours.  You will be more sensitive to light and vision will be blurry up close.  Mydriatic sunglasses were given if needed.     Lake Sevilla, ATA  [facility]  [address] [place]  [address]    [phone]            Again, thank you for allowing me to participate in the care of your patient.        Sincerely,        Lake Sevilla, ATA

## 2023-02-27 ENCOUNTER — APPOINTMENT (OUTPATIENT)
Dept: OPTOMETRY | Facility: CLINIC | Age: 43
End: 2023-02-27
Payer: COMMERCIAL

## 2023-02-27 PROCEDURE — 92340 FIT SPECTACLES MONOFOCAL: CPT | Performed by: OPTOMETRIST

## 2023-06-04 ENCOUNTER — HEALTH MAINTENANCE LETTER (OUTPATIENT)
Age: 43
End: 2023-06-04

## 2024-02-25 ENCOUNTER — HEALTH MAINTENANCE LETTER (OUTPATIENT)
Age: 44
End: 2024-02-25

## 2025-03-15 ENCOUNTER — HEALTH MAINTENANCE LETTER (OUTPATIENT)
Age: 45
End: 2025-03-15

## 2025-06-07 ENCOUNTER — HEALTH MAINTENANCE LETTER (OUTPATIENT)
Age: 45
End: 2025-06-07

## (undated) RX ORDER — LIDOCAINE HYDROCHLORIDE 10 MG/ML
INJECTION, SOLUTION EPIDURAL; INFILTRATION; INTRACAUDAL; PERINEURAL
Status: DISPENSED
Start: 2021-04-29